# Patient Record
Sex: MALE | Race: WHITE | Employment: OTHER | URBAN - METROPOLITAN AREA
[De-identification: names, ages, dates, MRNs, and addresses within clinical notes are randomized per-mention and may not be internally consistent; named-entity substitution may affect disease eponyms.]

---

## 2017-07-03 RX ORDER — LISINOPRIL 40 MG/1
TABLET ORAL
Qty: 90 TABLET | Refills: 0 | Status: SHIPPED | OUTPATIENT
Start: 2017-07-03 | End: 2017-10-04

## 2017-07-03 RX ORDER — DILTIAZEM HYDROCHLORIDE 300 MG/1
CAPSULE, EXTENDED RELEASE ORAL
Qty: 90 CAPSULE | Refills: 0 | Status: SHIPPED | OUTPATIENT
Start: 2017-07-03 | End: 2017-10-04

## 2017-07-03 NOTE — TELEPHONE ENCOUNTER
Per protocol; failed- route to provider  Patient is due for office visit    [facility] 12/9/16   Routed to  as well

## 2017-07-05 NOTE — TELEPHONE ENCOUNTER
Spoke with patient will call back. Aware last refill until seen in office; patient voiced understanding. Okay to close.

## 2017-10-05 RX ORDER — LISINOPRIL 40 MG/1
TABLET ORAL
Qty: 90 TABLET | Refills: 0 | Status: SHIPPED | OUTPATIENT
Start: 2017-10-05 | End: 2018-01-03

## 2017-10-05 RX ORDER — DILTIAZEM HYDROCHLORIDE 300 MG/1
CAPSULE, EXTENDED RELEASE ORAL
Qty: 90 CAPSULE | Refills: 0 | Status: SHIPPED | OUTPATIENT
Start: 2017-10-05 | End: 2018-01-03

## 2017-10-06 ENCOUNTER — TELEPHONE (OUTPATIENT)
Dept: INTERNAL MEDICINE CLINIC | Facility: CLINIC | Age: 67
End: 2017-10-06

## 2017-10-06 DIAGNOSIS — Z13.1 SCREENING FOR DIABETES MELLITUS: ICD-10-CM

## 2017-10-06 DIAGNOSIS — Z13.220 SCREENING, LIPID: Primary | ICD-10-CM

## 2017-10-06 DIAGNOSIS — Z12.5 SCREENING PSA (PROSTATE SPECIFIC ANTIGEN): ICD-10-CM

## 2017-10-06 DIAGNOSIS — Z13.0 SCREENING FOR DEFICIENCY ANEMIA: ICD-10-CM

## 2017-10-06 NOTE — TELEPHONE ENCOUNTER
Please call patient to reschedule his yearly check-up for after 12/9/17. Orders for labs are in and patient should have the labs done on or after 12/06/17.

## 2017-10-06 NOTE — TELEPHONE ENCOUNTER
Patient having a physical and would like to have labs prior to his px. Please place orders. Patient will be fasting.   Future Appointments  Date Time Provider Louis Jimenez   11/21/2017 9:00 AM Mary Jo Duncan DO [facility] IM

## 2017-10-11 NOTE — TELEPHONE ENCOUNTER
Patient will be out of state the entire month of December. New insurance this year okay to have before 12/9.

## 2017-11-17 ENCOUNTER — LAB ENCOUNTER (OUTPATIENT)
Dept: LAB | Age: 67
End: 2017-11-17
Attending: FAMILY MEDICINE
Payer: MEDICARE

## 2017-11-17 DIAGNOSIS — Z13.220 SCREENING, LIPID: ICD-10-CM

## 2017-11-17 DIAGNOSIS — Z13.1 SCREENING FOR DIABETES MELLITUS: ICD-10-CM

## 2017-11-17 DIAGNOSIS — Z13.0 SCREENING FOR DEFICIENCY ANEMIA: ICD-10-CM

## 2017-11-17 DIAGNOSIS — Z12.5 SCREENING PSA (PROSTATE SPECIFIC ANTIGEN): ICD-10-CM

## 2017-11-17 PROCEDURE — 80061 LIPID PANEL: CPT

## 2017-11-17 PROCEDURE — 85025 COMPLETE CBC W/AUTO DIFF WBC: CPT

## 2017-11-17 PROCEDURE — 80053 COMPREHEN METABOLIC PANEL: CPT

## 2017-11-20 ENCOUNTER — OFFICE VISIT (OUTPATIENT)
Dept: INTERNAL MEDICINE CLINIC | Facility: CLINIC | Age: 67
End: 2017-11-20

## 2017-11-20 ENCOUNTER — LAB ENCOUNTER (OUTPATIENT)
Dept: LAB | Age: 67
End: 2017-11-20
Attending: FAMILY MEDICINE
Payer: MEDICARE

## 2017-11-20 VITALS
RESPIRATION RATE: 16 BRPM | SYSTOLIC BLOOD PRESSURE: 124 MMHG | HEART RATE: 70 BPM | DIASTOLIC BLOOD PRESSURE: 70 MMHG | WEIGHT: 225 LBS | TEMPERATURE: 98 F | BODY MASS INDEX: 31.5 KG/M2 | HEIGHT: 71 IN

## 2017-11-20 DIAGNOSIS — Z11.59 NEED FOR HEPATITIS C SCREENING TEST: ICD-10-CM

## 2017-11-20 DIAGNOSIS — R79.89 ABNORMAL CBC: ICD-10-CM

## 2017-11-20 DIAGNOSIS — I10 ESSENTIAL HYPERTENSION: ICD-10-CM

## 2017-11-20 DIAGNOSIS — Z00.00 ENCOUNTER FOR ANNUAL HEALTH EXAMINATION: Primary | ICD-10-CM

## 2017-11-20 PROCEDURE — 86803 HEPATITIS C AB TEST: CPT

## 2017-11-20 PROCEDURE — 82607 VITAMIN B-12: CPT

## 2017-11-20 PROCEDURE — G0402 INITIAL PREVENTIVE EXAM: HCPCS | Performed by: FAMILY MEDICINE

## 2017-11-20 NOTE — PROGRESS NOTES
HPI:   Kavin Fink is a 74year old male who presents for a Medicare Initial Preventative Physical Exam (Welcome to Medicare- < 12 months on Medicare). Here for annual check-up. Patient has no complaints.  Taking BP meds as prescribed with no drinks alcohol. He reports that he does not use drugs.      REVIEW OF SYSTEMS:   GENERAL: feels well otherwise  SKIN: denies any unusual skin lesions  EYES: denies blurred vision or double vision  HEENT: denies nasal congestion, sinus pain or ST  LUNGS: den auscultation bilaterally, respirations unlabored   Chest Wall:  No tenderness or deformity   Heart:  Regular rate and rhythm, S1, S2 normal, no murmur, rub or gallop   Abdomen:   Soft, non-tender, bowel sounds active all four quadrants,  no masses, no orga plan.    Return in 6 months (on 5/20/2018).      Jony Denis, DO, 11/20/2017       General Health     In the past six months, have you lost more than 10 pounds without trying?: 2 - No    Has your appetite been poor?: No    How does the patient maintain a input here.   Cognitive Assessment     What day of the week is this?: Correct    What month is it?: Correct    What year is it?: Correct    Recall \"Ball\": Correct    Recall \"Flag\": Correct    Recall \"Tree\": Correct       This section provided for quic Annual Monitoring of Persistent     Medications (ACE/ARB, digoxin diuretics, anticonvulsants.)    Potassium  Annually Potassium (mmol/L)   Date Value   11/17/2017 4.2    No flowsheet data found.     Creatinine  Annually Creatinine (mg/dL)   Date Value   1

## 2017-11-20 NOTE — PATIENT INSTRUCTIONS
Edna Cook's SCREENING SCHEDULE   Tests on this list are recommended by your physician but may not be covered, or covered at this frequency, by your insurer. Please check with your insurance carrier before scheduling to verify coverage.     PREV every 10 years- more often if abnormal Colonoscopy,2 Year due on 02/26/2018 Update Health Maintenance if applicable    Flex Sigmoidoscopy Screen  Covered every 5 years No results found for this or any previous visit. No flowsheet data found.      Fecal Occu prescription benefits, but Medicare does not cover unless Medically needed    Zoster (Not covered by Medicare Part B) No orders found for this or any previous visit.  This may be covered with your pharmacy  prescription benefits     Recommended Websites for

## 2018-01-04 RX ORDER — LISINOPRIL 40 MG/1
TABLET ORAL
Qty: 90 TABLET | Refills: 1 | Status: SHIPPED | OUTPATIENT
Start: 2018-01-04 | End: 2018-06-22

## 2018-01-04 RX ORDER — DILTIAZEM HYDROCHLORIDE 300 MG/1
CAPSULE, EXTENDED RELEASE ORAL
Qty: 90 CAPSULE | Refills: 1 | Status: SHIPPED | OUTPATIENT
Start: 2018-01-04 | End: 2018-06-22

## 2018-01-04 RX ORDER — MELOXICAM 15 MG/1
TABLET ORAL
Qty: 30 TABLET | Refills: 2 | Status: SHIPPED | OUTPATIENT
Start: 2018-01-04 | End: 2018-06-22

## 2018-01-04 NOTE — TELEPHONE ENCOUNTER
LOV: 11/20/17   Future office visit: no upcoming visit   Last labs: 11/17/17 Psa Cbc Lipid Cmp   Last RX: Meloxicam: 12/9/16 #30 #2 Refill    Cartia 10/5/17 #90 No Refill    Lisinopril 10/5/17 #90 No Refill  Per protocol: Route to provider

## 2018-06-22 RX ORDER — LISINOPRIL 40 MG/1
40 TABLET ORAL
Qty: 90 TABLET | Refills: 0 | Status: SHIPPED
Start: 2018-06-22 | End: 2018-07-17

## 2018-06-22 RX ORDER — DILTIAZEM HYDROCHLORIDE 300 MG/1
300 CAPSULE, COATED, EXTENDED RELEASE ORAL
Qty: 90 CAPSULE | Refills: 0 | Status: SHIPPED
Start: 2018-06-22 | End: 2018-07-01

## 2018-06-22 RX ORDER — MELOXICAM 15 MG/1
15 TABLET ORAL DAILY
Qty: 30 TABLET | Refills: 2 | Status: SHIPPED
Start: 2018-06-22 | End: 2018-11-20

## 2018-06-22 NOTE — TELEPHONE ENCOUNTER
Spoke to pt and scheduled appt Future Appointments  Date Time Provider Louis Jimenez   6/29/2018 9:45 AM Ricky Alvarez, DO [facility] IM

## 2018-06-22 NOTE — TELEPHONE ENCOUNTER
From: Sammy Claros  Sent: 6/22/2018 10:54 AM CDT  Subject: Medication Renewal Request    Sammy Claros would like a refill of the following medications:     MELOXICAM 15 MG Oral Tab Lydia Remy, DO]     LISINOPRIL 40 MG Oral Tab Chandu Johnston

## 2018-06-29 ENCOUNTER — OFFICE VISIT (OUTPATIENT)
Dept: INTERNAL MEDICINE CLINIC | Facility: CLINIC | Age: 68
End: 2018-06-29

## 2018-06-29 VITALS
WEIGHT: 229.38 LBS | OXYGEN SATURATION: 96 % | SYSTOLIC BLOOD PRESSURE: 120 MMHG | HEIGHT: 71 IN | BODY MASS INDEX: 32.11 KG/M2 | HEART RATE: 71 BPM | DIASTOLIC BLOOD PRESSURE: 60 MMHG | RESPIRATION RATE: 16 BRPM

## 2018-06-29 DIAGNOSIS — I10 ESSENTIAL HYPERTENSION: Primary | ICD-10-CM

## 2018-06-29 PROCEDURE — 99213 OFFICE O/P EST LOW 20 MIN: CPT | Performed by: FAMILY MEDICINE

## 2018-06-29 NOTE — PROGRESS NOTES
HPI:    Patient ID: Kerri Covert is a 79year old male. HPI Here for f/u on BP meds. Has been taking as prescribed with no issues. Takes BP about once every 2 weeks and averages 130/80. No complaints.      Review of Systems   Constitutional: Ne

## 2018-07-02 RX ORDER — DILTIAZEM HYDROCHLORIDE 300 MG/1
CAPSULE, COATED, EXTENDED RELEASE ORAL
Qty: 90 CAPSULE | Refills: 1 | Status: SHIPPED | OUTPATIENT
Start: 2018-07-02 | End: 2018-11-20

## 2018-07-18 RX ORDER — LISINOPRIL 40 MG/1
40 TABLET ORAL
Qty: 90 TABLET | Refills: 1 | Status: SHIPPED | OUTPATIENT
Start: 2018-07-18 | End: 2018-11-20

## 2018-07-18 NOTE — TELEPHONE ENCOUNTER
From: Lizz Galan  Sent: 7/17/2018 6:43 PM CDT  Subject: Medication Renewal Request    Lizz Galan would like a refill of the following medications:     lisinopril 40 MG Oral Tab Jessica St DO]   Patient Comment:  This was not inclu

## 2018-07-18 NOTE — TELEPHONE ENCOUNTER
LOV: 6/29/18 AMS   Future office visit: No upcoming visit  Last labs: 11/20/17 Vit B12 HCV Psa Cc Lipid Cmp   Last RX: Lisinopril 6/22/18 #90 No Refills   Per protocol: Route to provider

## 2018-10-10 ENCOUNTER — TELEPHONE (OUTPATIENT)
Dept: INTERNAL MEDICINE CLINIC | Facility: CLINIC | Age: 68
End: 2018-10-10

## 2018-10-10 DIAGNOSIS — Z13.0 SCREENING FOR BLOOD DISEASE: ICD-10-CM

## 2018-10-10 DIAGNOSIS — Z13.220 SCREENING FOR LIPID DISORDERS: ICD-10-CM

## 2018-10-10 DIAGNOSIS — I10 ESSENTIAL HYPERTENSION: Primary | ICD-10-CM

## 2018-10-10 DIAGNOSIS — Z12.5 ENCOUNTER FOR SCREENING FOR MALIGNANT NEOPLASM OF PROSTATE: ICD-10-CM

## 2018-10-10 NOTE — TELEPHONE ENCOUNTER
Please place orders through [facility] for labs for upcoming wellness visit   Future Appointments   Date Time Provider Louis Jimenez   11/20/2018  9:00 AM Merrill Davis DO [facility] IM     Thank you

## 2018-10-29 ENCOUNTER — OFFICE VISIT (OUTPATIENT)
Dept: INTERNAL MEDICINE CLINIC | Facility: CLINIC | Age: 68
End: 2018-10-29
Payer: MEDICARE

## 2018-10-29 VITALS
SYSTOLIC BLOOD PRESSURE: 136 MMHG | HEART RATE: 72 BPM | RESPIRATION RATE: 16 BRPM | TEMPERATURE: 98 F | BODY MASS INDEX: 32 KG/M2 | WEIGHT: 231 LBS | DIASTOLIC BLOOD PRESSURE: 80 MMHG

## 2018-10-29 DIAGNOSIS — M25.552 PAIN OF LEFT HIP JOINT: Primary | ICD-10-CM

## 2018-10-29 PROCEDURE — 99213 OFFICE O/P EST LOW 20 MIN: CPT | Performed by: FAMILY MEDICINE

## 2018-10-29 RX ORDER — METHYLPREDNISOLONE 4 MG/1
TABLET ORAL
Qty: 1 KIT | Refills: 0 | Status: SHIPPED | OUTPATIENT
Start: 2018-10-29 | End: 2018-11-20 | Stop reason: ALTCHOICE

## 2018-10-29 NOTE — PROGRESS NOTES
HPI:    Patient ID: Fina Larkin is a 79year old male. HPI Here with c/o a few months of pain on side of left hip. Feels some in his buttocks a little as well. No injury. No numbness/tingling/weakness.  Has tried taking Meloxicam for this and do ORTHOPEDIC - INTERNAL       [id number]

## 2018-11-16 ENCOUNTER — LAB ENCOUNTER (OUTPATIENT)
Dept: LAB | Age: 68
End: 2018-11-16
Attending: FAMILY MEDICINE
Payer: MEDICARE

## 2018-11-16 DIAGNOSIS — I10 ESSENTIAL HYPERTENSION: ICD-10-CM

## 2018-11-16 DIAGNOSIS — Z13.0 SCREENING FOR BLOOD DISEASE: ICD-10-CM

## 2018-11-16 DIAGNOSIS — R73.01 IMPAIRED FASTING GLUCOSE: ICD-10-CM

## 2018-11-16 DIAGNOSIS — Z13.220 SCREENING FOR LIPID DISORDERS: ICD-10-CM

## 2018-11-16 DIAGNOSIS — R73.01 IMPAIRED FASTING GLUCOSE: Primary | ICD-10-CM

## 2018-11-16 DIAGNOSIS — Z12.5 ENCOUNTER FOR SCREENING FOR MALIGNANT NEOPLASM OF PROSTATE: ICD-10-CM

## 2018-11-16 PROCEDURE — 80053 COMPREHEN METABOLIC PANEL: CPT

## 2018-11-16 PROCEDURE — 85025 COMPLETE CBC W/AUTO DIFF WBC: CPT

## 2018-11-16 PROCEDURE — 83036 HEMOGLOBIN GLYCOSYLATED A1C: CPT

## 2018-11-16 PROCEDURE — 80061 LIPID PANEL: CPT

## 2018-11-20 ENCOUNTER — OFFICE VISIT (OUTPATIENT)
Dept: INTERNAL MEDICINE CLINIC | Facility: CLINIC | Age: 68
End: 2018-11-20
Payer: MEDICARE

## 2018-11-20 VITALS
DIASTOLIC BLOOD PRESSURE: 72 MMHG | RESPIRATION RATE: 16 BRPM | WEIGHT: 230 LBS | HEART RATE: 72 BPM | BODY MASS INDEX: 32 KG/M2 | TEMPERATURE: 98 F | SYSTOLIC BLOOD PRESSURE: 124 MMHG

## 2018-11-20 DIAGNOSIS — R74.8 ELEVATED LIVER ENZYMES: ICD-10-CM

## 2018-11-20 DIAGNOSIS — I10 ESSENTIAL HYPERTENSION: ICD-10-CM

## 2018-11-20 DIAGNOSIS — R73.02 IMPAIRED GLUCOSE TOLERANCE: ICD-10-CM

## 2018-11-20 DIAGNOSIS — M25.552 LEFT HIP PAIN: ICD-10-CM

## 2018-11-20 DIAGNOSIS — Z00.00 ENCOUNTER FOR ANNUAL HEALTH EXAMINATION: Primary | ICD-10-CM

## 2018-11-20 PROCEDURE — G0438 PPPS, INITIAL VISIT: HCPCS | Performed by: FAMILY MEDICINE

## 2018-11-20 RX ORDER — MELOXICAM 15 MG/1
15 TABLET ORAL DAILY
Qty: 90 TABLET | Refills: 1 | Status: SHIPPED | OUTPATIENT
Start: 2018-11-20 | End: 2019-06-12

## 2018-11-20 RX ORDER — DILTIAZEM HYDROCHLORIDE 300 MG/1
300 CAPSULE, COATED, EXTENDED RELEASE ORAL
Qty: 90 CAPSULE | Refills: 1 | Status: SHIPPED | OUTPATIENT
Start: 2018-11-20 | End: 2019-06-12

## 2018-11-20 RX ORDER — LISINOPRIL 40 MG/1
40 TABLET ORAL
Qty: 90 TABLET | Refills: 1 | Status: SHIPPED | OUTPATIENT
Start: 2018-11-20 | End: 2019-06-12

## 2018-11-23 DIAGNOSIS — R73.02 IMPAIRED GLUCOSE TOLERANCE: ICD-10-CM

## 2018-11-23 DIAGNOSIS — R74.8 ELEVATED LIVER ENZYMES: Primary | ICD-10-CM

## 2018-11-23 NOTE — PROGRESS NOTES
HPI:   Shagufta Smith is a 79year old male who presents for a Medicare Initial Annual Wellness visit (Once after 12 month Medicare anniversary) . Here for annual check-up. Patient continues to have left hip pain and is seeing Ortho.    Taking BP in [place]. The patient has this document but we do not have it in [facility], and patient is instructed to get our office a copy of it for scanning into Epic. He does NOT have a Power of  for [employer] on file in [place].    The patient has this docu Multiple allergies, and Wears glasses. He  has a past surgical history that includes appendectomy; back surgery; and colonoscopy.     His family history includes Depression in an other family member; Heart Disease in his father and mother; Hypertension i age   Head:  Normocephalic, without obvious abnormality, atraumatic   Eyes:  PERRL, conjunctiva/corneas clear, EOM's intact, both eyes   Ears:  Normal TM's and external ear canals, both ears   Nose: Nares normal, septum midline, mucosa normal, no drainage 1 capsule (300 mg total) by mouth once daily. Left hip pain  -     Meloxicam 15 MG Oral Tab; Take 1 tablet (15 mg total) by mouth daily. Impaired glucose tolerance    Elevated liver enzymes      Reviewed lab results with patient.  Impaired glucose vijay Glaucoma Screening      Ophthalmology Visit Annually: Diabetics, FHx Glaucoma, AA>50, > 65 No flowsheet data found.     Prostate Cancer Screening      PSA  Annually PSA due on 11/16/2020  Update Health Maintenance if applicable     Immunizations

## 2019-06-10 ENCOUNTER — APPOINTMENT (OUTPATIENT)
Dept: LAB | Age: 69
End: 2019-06-10
Attending: FAMILY MEDICINE
Payer: MEDICARE

## 2019-06-10 DIAGNOSIS — R73.02 IMPAIRED GLUCOSE TOLERANCE: ICD-10-CM

## 2019-06-10 DIAGNOSIS — R74.8 ELEVATED LIVER ENZYMES: ICD-10-CM

## 2019-06-10 PROCEDURE — 80053 COMPREHEN METABOLIC PANEL: CPT

## 2019-06-10 PROCEDURE — 83036 HEMOGLOBIN GLYCOSYLATED A1C: CPT

## 2019-06-12 ENCOUNTER — PATIENT MESSAGE (OUTPATIENT)
Dept: INTERNAL MEDICINE CLINIC | Facility: CLINIC | Age: 69
End: 2019-06-12

## 2019-06-12 ENCOUNTER — OFFICE VISIT (OUTPATIENT)
Dept: INTERNAL MEDICINE CLINIC | Facility: CLINIC | Age: 69
End: 2019-06-12
Payer: MEDICARE

## 2019-06-12 VITALS
WEIGHT: 228 LBS | DIASTOLIC BLOOD PRESSURE: 72 MMHG | HEART RATE: 76 BPM | SYSTOLIC BLOOD PRESSURE: 122 MMHG | HEIGHT: 67 IN | RESPIRATION RATE: 15 BRPM | BODY MASS INDEX: 35.79 KG/M2 | OXYGEN SATURATION: 96 %

## 2019-06-12 DIAGNOSIS — S43.491A OTHER SPRAIN OF RIGHT SHOULDER JOINT, INITIAL ENCOUNTER: ICD-10-CM

## 2019-06-12 DIAGNOSIS — I10 ESSENTIAL HYPERTENSION: Primary | ICD-10-CM

## 2019-06-12 DIAGNOSIS — R73.02 IMPAIRED GLUCOSE TOLERANCE: ICD-10-CM

## 2019-06-12 PROCEDURE — 99214 OFFICE O/P EST MOD 30 MIN: CPT | Performed by: FAMILY MEDICINE

## 2019-06-12 PROCEDURE — 1111F DSCHRG MED/CURRENT MED MERGE: CPT | Performed by: FAMILY MEDICINE

## 2019-06-12 RX ORDER — DILTIAZEM HYDROCHLORIDE 300 MG/1
300 CAPSULE, COATED, EXTENDED RELEASE ORAL
Qty: 90 CAPSULE | Refills: 1 | Status: SHIPPED | OUTPATIENT
Start: 2019-06-12 | End: 2019-11-22

## 2019-06-12 RX ORDER — MELOXICAM 15 MG/1
15 TABLET ORAL DAILY
Qty: 90 TABLET | Refills: 1 | Status: SHIPPED | OUTPATIENT
Start: 2019-06-12 | End: 2019-11-22

## 2019-06-12 RX ORDER — LISINOPRIL 40 MG/1
40 TABLET ORAL
Qty: 90 TABLET | Refills: 1 | Status: SHIPPED | OUTPATIENT
Start: 2019-06-12 | End: 2019-11-22

## 2019-06-12 NOTE — TELEPHONE ENCOUNTER
From: Jose Bourne  To: Mary Jo Duncan DO  Sent: 6/12/2019 9:48 AM CDT  Subject: Test Results Question    When can I expect my recent lab work results to be posted?     Thanks,  Meg Mooney

## 2019-06-12 NOTE — PROGRESS NOTES
HPI:    Patient ID: Joan Bailon is a 81year old male. HPI Here for f/u on BP. Taking med as prescribed with no issues. Doesn't check BP regularly but no chest pain/shortness of breath/lightheadedness/headaches.    Has been trying to limit carbs ASSESSMENT/PLAN:   Essential hypertension  (primary encounter diagnosis)  Impaired glucose tolerance  Other sprain of right shoulder joint, initial encounter  1. BP controlled on meds. Continue. 2. Reviewed results. Improved from previous.  Continue

## 2019-09-17 PROBLEM — M75.81 TENDINITIS OF RIGHT ROTATOR CUFF: Status: ACTIVE | Noted: 2019-09-17

## 2019-10-01 ENCOUNTER — TELEPHONE (OUTPATIENT)
Dept: INTERNAL MEDICINE CLINIC | Facility: CLINIC | Age: 69
End: 2019-10-01

## 2019-10-02 ENCOUNTER — TELEPHONE (OUTPATIENT)
Dept: INTERNAL MEDICINE CLINIC | Facility: CLINIC | Age: 69
End: 2019-10-02

## 2019-10-02 DIAGNOSIS — E78.00 ELEVATED CHOLESTEROL: ICD-10-CM

## 2019-10-02 DIAGNOSIS — R73.09 ELEVATED GLUCOSE: ICD-10-CM

## 2019-10-02 DIAGNOSIS — R73.02 IMPAIRED GLUCOSE TOLERANCE: ICD-10-CM

## 2019-10-02 DIAGNOSIS — I10 ESSENTIAL HYPERTENSION: Primary | ICD-10-CM

## 2019-10-02 DIAGNOSIS — Z12.5 SCREENING FOR PROSTATE CANCER: ICD-10-CM

## 2019-10-02 NOTE — TELEPHONE ENCOUNTER
Future Appointments   Date Time Provider Louis Barbara                        11/22/2019 10:00 AM Modesto Nuñez, DO [facility]       Patient is scheduled for Annual Physical.  Please place orders with Emory Peralta Dr. Patient will be fasting.

## 2019-11-14 ENCOUNTER — APPOINTMENT (OUTPATIENT)
Dept: LAB | Age: 69
End: 2019-11-14
Attending: FAMILY MEDICINE
Payer: MEDICARE

## 2019-11-14 DIAGNOSIS — I10 ESSENTIAL HYPERTENSION: ICD-10-CM

## 2019-11-14 DIAGNOSIS — R73.02 IMPAIRED GLUCOSE TOLERANCE: ICD-10-CM

## 2019-11-14 DIAGNOSIS — Z12.5 SCREENING FOR PROSTATE CANCER: ICD-10-CM

## 2019-11-14 DIAGNOSIS — E78.00 ELEVATED CHOLESTEROL: ICD-10-CM

## 2019-11-14 DIAGNOSIS — R73.09 ELEVATED GLUCOSE: ICD-10-CM

## 2019-11-14 PROCEDURE — 80053 COMPREHEN METABOLIC PANEL: CPT

## 2019-11-14 PROCEDURE — 83036 HEMOGLOBIN GLYCOSYLATED A1C: CPT

## 2019-11-14 PROCEDURE — 80061 LIPID PANEL: CPT

## 2019-11-22 ENCOUNTER — OFFICE VISIT (OUTPATIENT)
Dept: INTERNAL MEDICINE CLINIC | Facility: CLINIC | Age: 69
End: 2019-11-22
Payer: MEDICARE

## 2019-11-22 VITALS
HEART RATE: 76 BPM | DIASTOLIC BLOOD PRESSURE: 64 MMHG | HEIGHT: 71.26 IN | BODY MASS INDEX: 31.57 KG/M2 | RESPIRATION RATE: 18 BRPM | SYSTOLIC BLOOD PRESSURE: 114 MMHG | TEMPERATURE: 97 F | WEIGHT: 228 LBS

## 2019-11-22 DIAGNOSIS — I10 ESSENTIAL HYPERTENSION: ICD-10-CM

## 2019-11-22 DIAGNOSIS — Z00.00 ENCOUNTER FOR ANNUAL HEALTH EXAMINATION: Primary | ICD-10-CM

## 2019-11-22 DIAGNOSIS — M75.81 TENDINITIS OF RIGHT ROTATOR CUFF: ICD-10-CM

## 2019-11-22 PROCEDURE — G0439 PPPS, SUBSEQ VISIT: HCPCS | Performed by: FAMILY MEDICINE

## 2019-11-22 RX ORDER — DILTIAZEM HYDROCHLORIDE 300 MG/1
300 CAPSULE, COATED, EXTENDED RELEASE ORAL
Qty: 90 CAPSULE | Refills: 1 | Status: SHIPPED | OUTPATIENT
Start: 2019-11-22 | End: 2020-09-16

## 2019-11-22 RX ORDER — LISINOPRIL 40 MG/1
40 TABLET ORAL
Qty: 90 TABLET | Refills: 1 | Status: SHIPPED | OUTPATIENT
Start: 2019-11-22 | End: 2020-09-16

## 2019-11-22 NOTE — PROGRESS NOTES
HPI:   Israel Fitzpatrick is a 81year old male who presents for a Medicare Subsequent Annual Wellness visit (Pt already had Initial Annual Wellness). Here for f/u on BP. Taking med as prescribed with no issues.  Doesn't check BP regularly but no cindi 11/14/2019    HDL 80 (H) 11/14/2019    LDL 84 11/14/2019    TRIG 140 11/14/2019          Last Chemistry Labs:   Lab Results   Component Value Date    AST 25 11/14/2019    ALT 58 11/14/2019    CA 9.1 11/14/2019    ALB 3.4 11/14/2019    CREATSERUM 0.99 11/14 denies abdominal pain, denies heartburn  : 1 per night nocturia, no complaint of urinary incontinence  MUSCULOSKELETAL: denies back pain  NEURO: denies headaches  PSYCHE: denies depression or anxiety  HEMATOLOGIC: denies hx of anemia  ENDOCRINE: denies t Neurologic: Normal            Vaccination History     Immunization History   Administered Date(s) Administered   • Depo-Medrol 40mg Inj 10/30/2018, 02/22/2019, 10/17/2019   • FLU VACC High Dose 72 YRS & Older PRSV Free (23000) 10/09/2017, 09/25/2018   • Procedure External Lab or Procedure   Diabetes Screening      HbgA1C   Annually HgbA1C (%)   Date Value   11/14/2019 5.7 (H)       No flowsheet data found.     Fasting Blood Sugar (FSB)Annually Glucose (mg/dL)   Date Value   11/14/2019 112 (H)       Cardiov Zoster   Not covered by Medicare Part B No vaccine history found This may be covered with your pharmacy  prescription benefits      [facility] Internal Lab or Procedure External Lab or Procedure      Annual Monitoring of Persistent     Med

## 2019-11-22 NOTE — PATIENT INSTRUCTIONS
Yoly Cook's SCREENING SCHEDULE   Tests on this list are recommended by your physician but may not be covered, or covered at this frequency, by your insurer. Please check with your insurance carrier before scheduling to verify coverage.     PREV years- more often if abnormal Colonoscopy due on 04/27/2023 Update Health Maintenance if applicable    Flex Sigmoidoscopy Screen  Covered every 5 years No results found for this or any previous visit. No flowsheet data found.      Fecal Occult Blood   Cover benefits, but Medicare does not cover unless Medically needed    Zoster (Not covered by Medicare Part B) No orders found for this or any previous visit.  This may be covered with your pharmacy  prescription benefits     Recommended Websites for Advanced Dir

## 2019-12-30 ENCOUNTER — HOSPITAL ENCOUNTER (OUTPATIENT)
Dept: MRI IMAGING | Age: 69
Discharge: HOME | End: 2019-12-30
Attending: ORTHOPAEDIC SURGERY
Payer: MEDICARE

## 2019-12-30 DIAGNOSIS — M75.81 TENDINITIS OF RIGHT ROTATOR CUFF: ICD-10-CM

## 2019-12-30 PROCEDURE — 73221 MRI JOINT UPR EXTREM W/O DYE: CPT | Performed by: ORTHOPAEDIC SURGERY

## 2020-02-27 PROBLEM — Z98.890 S/P ARTHROSCOPY OF RIGHT SHOULDER: Status: ACTIVE | Noted: 2020-02-27

## 2020-07-14 ENCOUNTER — PATIENT MESSAGE (OUTPATIENT)
Dept: INTERNAL MEDICINE CLINIC | Facility: CLINIC | Age: 70
End: 2020-07-14

## 2020-07-14 RX ORDER — METHYLPREDNISOLONE 4 MG/1
TABLET ORAL
Qty: 1 KIT | Refills: 0 | Status: SHIPPED | OUTPATIENT
Start: 2020-07-14 | End: 2020-12-04

## 2020-07-14 NOTE — TELEPHONE ENCOUNTER
Medrol dose pack sent to his listed pharmacy. Give ER warning signs- worsening pain, fever, spreading redness. Have him schedule with Pacheco Marrufo this Thursday to see how he is doing (he is overdue for BP office visit anyway).

## 2020-07-14 NOTE — TELEPHONE ENCOUNTER
From: Caridad Fried  To: Ishaan Stroud DO  Sent: 7/14/2020 1:22 PM CDT  Subject: Other    I developed a severe case of Gout (Ankle and Finger) and noticed Dr. Stephanie Stover is not available to next week. Can you please advise how to proceed?  I added p

## 2020-07-14 NOTE — TELEPHONE ENCOUNTER
Patient notified to start Medrol Dose pack sent to his pharmacy on file, ER warnings given ie worsening pain, fever or spreading redness. Pt scheduled with AL for Thurs 7/16/20 at 9:30am.  Pt verbalizes understanding.

## 2020-07-16 ENCOUNTER — OFFICE VISIT (OUTPATIENT)
Dept: INTERNAL MEDICINE CLINIC | Facility: CLINIC | Age: 70
End: 2020-07-16
Payer: MEDICARE

## 2020-07-16 VITALS
DIASTOLIC BLOOD PRESSURE: 76 MMHG | BODY MASS INDEX: 32.2 KG/M2 | HEART RATE: 70 BPM | SYSTOLIC BLOOD PRESSURE: 148 MMHG | WEIGHT: 230 LBS | TEMPERATURE: 98 F | HEIGHT: 71 IN

## 2020-07-16 DIAGNOSIS — I10 ESSENTIAL HYPERTENSION: Primary | ICD-10-CM

## 2020-07-16 DIAGNOSIS — R73.09 ELEVATED HEMOGLOBIN A1C MEASUREMENT: ICD-10-CM

## 2020-07-16 DIAGNOSIS — M10.9 ACUTE GOUT OF RIGHT ANKLE, UNSPECIFIED CAUSE: ICD-10-CM

## 2020-07-16 PROCEDURE — 99214 OFFICE O/P EST MOD 30 MIN: CPT | Performed by: NURSE PRACTITIONER

## 2020-07-16 NOTE — PATIENT INSTRUCTIONS
Continue to take steroids as directed. Monitor your diet and limit your intake of alcohol. Schedule an appointment to get fasting labs completed. Follow-up in 2-weeks. Call sooner if your symptoms worsen after completing the steroid pack.       Please c reduce uric acid levels include febuxostat, lesinurad, and probencid.  Other medicines can help relieve pain and swelling during an acute attack. Medicines such as NSAIDs (nonsteroidal anti-inflammatory medicines), steroids, and colchicine may be prescribed

## 2020-07-16 NOTE — PROGRESS NOTES
HPI:    Patient ID: Ally Fink is a 78year old male. Patient presents with c/o right ankle pain and swelling and swelling overlying the PIP joint of the 3rd digit on the left hand for 6-days. Reports symptoms worsened 2-days-ago.   [facility] Med Neurological: Negative for dizziness, weakness, light-headedness and headaches. Current Outpatient Medications   Medication Sig Dispense Refill   • methylPREDNISolone (MEDROL) 4 MG Oral Tablet Therapy Pack As directed.  1 kit 0   • lisinopril 40 Meds This Visit:  Requested Prescriptions      No prescriptions requested or ordered in this encounter       Imaging & Referrals:  None       #[id number]

## 2020-07-17 ENCOUNTER — LAB ENCOUNTER (OUTPATIENT)
Dept: LAB | Age: 70
End: 2020-07-17
Attending: NURSE PRACTITIONER
Payer: MEDICARE

## 2020-07-17 DIAGNOSIS — I10 ESSENTIAL HYPERTENSION: ICD-10-CM

## 2020-07-17 DIAGNOSIS — R73.09 ELEVATED HEMOGLOBIN A1C MEASUREMENT: ICD-10-CM

## 2020-07-17 DIAGNOSIS — M10.9 ACUTE GOUT OF RIGHT ANKLE, UNSPECIFIED CAUSE: ICD-10-CM

## 2020-07-17 LAB
ALBUMIN SERPL-MCNC: 3.7 G/DL (ref 3.4–5)
ALBUMIN/GLOB SERPL: 0.8 {RATIO} (ref 1–2)
ALP LIVER SERPL-CCNC: 59 U/L (ref 45–117)
ALT SERPL-CCNC: 36 U/L (ref 16–61)
ANION GAP SERPL CALC-SCNC: 3 MMOL/L (ref 0–18)
AST SERPL-CCNC: 20 U/L (ref 15–37)
BASOPHILS # BLD AUTO: 0.02 X10(3) UL (ref 0–0.2)
BASOPHILS NFR BLD AUTO: 0.2 %
BILIRUB SERPL-MCNC: 0.5 MG/DL (ref 0.1–2)
BUN BLD-MCNC: 17 MG/DL (ref 7–18)
BUN/CREAT SERPL: 16 (ref 10–20)
CALCIUM BLD-MCNC: 9.5 MG/DL (ref 8.5–10.1)
CHLORIDE SERPL-SCNC: 105 MMOL/L (ref 98–112)
CO2 SERPL-SCNC: 27 MMOL/L (ref 21–32)
CREAT BLD-MCNC: 1.06 MG/DL (ref 0.7–1.3)
DEPRECATED RDW RBC AUTO: 42.7 FL (ref 35.1–46.3)
EOSINOPHIL # BLD AUTO: 0.01 X10(3) UL (ref 0–0.7)
EOSINOPHIL NFR BLD AUTO: 0.1 %
ERYTHROCYTE [DISTWIDTH] IN BLOOD BY AUTOMATED COUNT: 11.5 % (ref 11–15)
EST. AVERAGE GLUCOSE BLD GHB EST-MCNC: 117 MG/DL (ref 68–126)
GLOBULIN PLAS-MCNC: 4.5 G/DL (ref 2.8–4.4)
GLUCOSE BLD-MCNC: 118 MG/DL (ref 70–99)
HBA1C MFR BLD HPLC: 5.7 % (ref ?–5.7)
HCT VFR BLD AUTO: 44.9 % (ref 39–53)
HGB BLD-MCNC: 15.1 G/DL (ref 13–17.5)
IMM GRANULOCYTES # BLD AUTO: 0.05 X10(3) UL (ref 0–1)
IMM GRANULOCYTES NFR BLD: 0.5 %
LYMPHOCYTES # BLD AUTO: 2.03 X10(3) UL (ref 1–4)
LYMPHOCYTES NFR BLD AUTO: 19.4 %
M PROTEIN MFR SERPL ELPH: 8.2 G/DL (ref 6.4–8.2)
MCH RBC QN AUTO: 33.9 PG (ref 26–34)
MCHC RBC AUTO-ENTMCNC: 33.6 G/DL (ref 31–37)
MCV RBC AUTO: 100.9 FL (ref 80–100)
MONOCYTES # BLD AUTO: 0.97 X10(3) UL (ref 0.1–1)
MONOCYTES NFR BLD AUTO: 9.3 %
NEUTROPHILS # BLD AUTO: 7.4 X10 (3) UL (ref 1.5–7.7)
NEUTROPHILS # BLD AUTO: 7.4 X10(3) UL (ref 1.5–7.7)
NEUTROPHILS NFR BLD AUTO: 70.5 %
OSMOLALITY SERPL CALC.SUM OF ELEC: 283 MOSM/KG (ref 275–295)
PATIENT FASTING Y/N/NP: YES
PLATELET # BLD AUTO: 241 10(3)UL (ref 150–450)
POTASSIUM SERPL-SCNC: 3.7 MMOL/L (ref 3.5–5.1)
RBC # BLD AUTO: 4.45 X10(6)UL (ref 3.8–5.8)
SODIUM SERPL-SCNC: 135 MMOL/L (ref 136–145)
WBC # BLD AUTO: 10.5 X10(3) UL (ref 4–11)

## 2020-07-17 PROCEDURE — 80053 COMPREHEN METABOLIC PANEL: CPT

## 2020-07-17 PROCEDURE — 36415 COLL VENOUS BLD VENIPUNCTURE: CPT

## 2020-07-17 PROCEDURE — 85025 COMPLETE CBC W/AUTO DIFF WBC: CPT

## 2020-07-17 PROCEDURE — 83036 HEMOGLOBIN GLYCOSYLATED A1C: CPT

## 2020-07-29 ENCOUNTER — TELEPHONE (OUTPATIENT)
Dept: INTERNAL MEDICINE CLINIC | Facility: CLINIC | Age: 70
End: 2020-07-29

## 2020-07-29 NOTE — TELEPHONE ENCOUNTER
Per mychart message patient no longer needs follow up visit due to patient reporting improvement of symptoms and sent BP for AMS to review. PER AMS on 7/27/2020 \"no need to follow-up. Continue current medication\".       Patient canceled follow up appoint

## 2020-07-29 NOTE — TELEPHONE ENCOUNTER
Appointment canceled for Mary Cierra ([id number])   Visit Type: [facility]   Date        Time      Length    Provider                  Department   7/31/2020    8:45 AM  15 mins.  Ellie Iglesias DO       [facility] [address] [place] [address]      Reason for

## 2020-09-16 DIAGNOSIS — I10 ESSENTIAL HYPERTENSION: ICD-10-CM

## 2020-09-17 RX ORDER — LISINOPRIL 40 MG/1
40 TABLET ORAL
Qty: 90 TABLET | Refills: 1 | Status: SHIPPED | OUTPATIENT
Start: 2020-09-17 | End: 2020-12-04

## 2020-09-17 RX ORDER — DILTIAZEM HYDROCHLORIDE 300 MG/1
300 CAPSULE, COATED, EXTENDED RELEASE ORAL
Qty: 90 CAPSULE | Refills: 1 | Status: SHIPPED | OUTPATIENT
Start: 2020-09-17 | End: 2020-12-04

## 2020-11-10 ENCOUNTER — TELEPHONE (OUTPATIENT)
Dept: INTERNAL MEDICINE CLINIC | Facility: CLINIC | Age: 70
End: 2020-11-10

## 2020-11-10 DIAGNOSIS — R73.02 IMPAIRED GLUCOSE TOLERANCE: ICD-10-CM

## 2020-11-10 DIAGNOSIS — R73.09 ELEVATED GLUCOSE: ICD-10-CM

## 2020-11-10 DIAGNOSIS — R73.09 ELEVATED HEMOGLOBIN A1C MEASUREMENT: ICD-10-CM

## 2020-11-10 DIAGNOSIS — Z00.00 ENCOUNTER FOR ANNUAL HEALTH EXAMINATION: Primary | ICD-10-CM

## 2020-11-10 DIAGNOSIS — E78.00 ELEVATED CHOLESTEROL: ICD-10-CM

## 2020-11-10 NOTE — TELEPHONE ENCOUNTER
Future Appointments   Date Time Provider Louis Barbara   11/25/2020  2:00 PM Joy Hernandez,  [facility]     For       Pt would like fasting labs sent to [facility]. Pt aware to complete 5-7 days ahead.

## 2020-11-16 ENCOUNTER — PATIENT MESSAGE (OUTPATIENT)
Dept: INTERNAL MEDICINE CLINIC | Facility: CLINIC | Age: 70
End: 2020-11-16

## 2020-11-16 DIAGNOSIS — Z12.5 SCREENING PSA (PROSTATE SPECIFIC ANTIGEN): Primary | ICD-10-CM

## 2020-11-16 NOTE — TELEPHONE ENCOUNTER
From: Jerry Zurita  To: Genia Iraheta DO  Sent: 11/16/2020 12:18 PM CST  Subject: Non-Urgent Medical Question    Just want to make sure a PSA test is included in my lab work.     Thanks,  Zora Riggs

## 2020-11-19 ENCOUNTER — LAB ENCOUNTER (OUTPATIENT)
Dept: LAB | Age: 70
End: 2020-11-19
Attending: FAMILY MEDICINE
Payer: MEDICARE

## 2020-11-19 DIAGNOSIS — Z12.5 SCREENING PSA (PROSTATE SPECIFIC ANTIGEN): ICD-10-CM

## 2020-11-19 DIAGNOSIS — Z00.00 ENCOUNTER FOR ANNUAL HEALTH EXAMINATION: ICD-10-CM

## 2020-11-19 DIAGNOSIS — R73.02 IMPAIRED GLUCOSE TOLERANCE: ICD-10-CM

## 2020-11-19 PROCEDURE — 83036 HEMOGLOBIN GLYCOSYLATED A1C: CPT

## 2020-11-19 PROCEDURE — 36415 COLL VENOUS BLD VENIPUNCTURE: CPT

## 2020-11-19 PROCEDURE — 80061 LIPID PANEL: CPT

## 2020-12-04 ENCOUNTER — OFFICE VISIT (OUTPATIENT)
Dept: INTERNAL MEDICINE CLINIC | Facility: CLINIC | Age: 70
End: 2020-12-04
Payer: MEDICARE

## 2020-12-04 VITALS
OXYGEN SATURATION: 97 % | TEMPERATURE: 99 F | SYSTOLIC BLOOD PRESSURE: 138 MMHG | HEIGHT: 71 IN | WEIGHT: 229.63 LBS | BODY MASS INDEX: 32.15 KG/M2 | HEART RATE: 71 BPM | DIASTOLIC BLOOD PRESSURE: 74 MMHG

## 2020-12-04 DIAGNOSIS — I10 ESSENTIAL HYPERTENSION: ICD-10-CM

## 2020-12-04 DIAGNOSIS — Z00.00 ENCOUNTER FOR ANNUAL HEALTH EXAMINATION: Primary | ICD-10-CM

## 2020-12-04 DIAGNOSIS — Z12.5 SCREENING PSA (PROSTATE SPECIFIC ANTIGEN): ICD-10-CM

## 2020-12-04 DIAGNOSIS — R97.20 ELEVATED PSA: ICD-10-CM

## 2020-12-04 PROBLEM — M75.81 TENDINITIS OF RIGHT ROTATOR CUFF: Status: RESOLVED | Noted: 2019-09-17 | Resolved: 2020-12-04

## 2020-12-04 PROBLEM — Z98.890 S/P ARTHROSCOPY OF RIGHT SHOULDER: Status: RESOLVED | Noted: 2020-02-27 | Resolved: 2020-12-04

## 2020-12-04 PROCEDURE — G0439 PPPS, SUBSEQ VISIT: HCPCS | Performed by: FAMILY MEDICINE

## 2020-12-04 RX ORDER — LISINOPRIL 40 MG/1
40 TABLET ORAL
Qty: 90 TABLET | Refills: 1 | Status: SHIPPED | OUTPATIENT
Start: 2020-12-04 | End: 2021-11-01

## 2020-12-04 RX ORDER — DILTIAZEM HYDROCHLORIDE 300 MG/1
300 CAPSULE, COATED, EXTENDED RELEASE ORAL
Qty: 90 CAPSULE | Refills: 1 | Status: SHIPPED | OUTPATIENT
Start: 2020-12-04 | End: 2021-11-01

## 2020-12-04 NOTE — PATIENT INSTRUCTIONS
Repeat your PSA blood test in 2 weeks.  Avoid strenuous activity, lifting, bike riding, intercourse for at least 48 hours prior to test.         Wanda Cook's SCREENING SCHEDULE   Tests on this list are recommended by your physician but may not be smoked more than 100 cigarettes in their lifetime   • Anyone with a family history    Colorectal Cancer Screening Covered up to Age 76     Colonoscopy Screen   Covered every 10 years- more often if abnormal Colonoscopy due on 04/27/2023 Update [facility] Maint Only covered with a cut with metal- TD and TDaP Not covered by Medicare Part B) No orders found for this or any previous visit.  This may be covered with your prescription benefits, but Medicare does not cover unless Medically needed    Zoster (Not covered

## 2020-12-04 NOTE — PROGRESS NOTES
HPI:   Sammy Claros is a 78year old male who presents for a Medicare Subsequent Annual Wellness visit (Pt already had Initial Annual Wellness).     Essential hypertension- taking meds, no symptoms of elevated BP.  -     lisinopril 40 MG Oral Tab; Last Cholesterol Labs:   Lab Results   Component Value Date    CHOLEST 189 11/19/2020    HDL 66 (H) 11/19/2020    LDL 99 11/19/2020    TRIG 118 11/19/2020          Last Chemistry Labs:   Lab Results   Component Value Date    AST 20 07/17/2020    ALT 36 0 drugs.     REVIEW OF SYSTEMS:   GENERAL: feels well otherwise  SKIN: denies any unusual skin lesions  EYES: denies blurred vision or double vision  HEENT: denies nasal congestion, sinus pain or ST  LUNGS: denies shortness of breath with exertion  CARDIOVAS Extremities: Extremities normal, atraumatic, no cyanosis or edema   Pulses: 2+ and symmetric   Skin: Skin color, texture, turgor normal, no rashes or lesions   Lymph nodes: Cervical, supraclavicular, and axillary nodes normal   Neurologic: Normal daily.  -     dilTIAZem HCl ER Coated Beads 300 MG Oral Capsule SR 24 Hr; Take 1 capsule (300 mg total) by mouth once daily. Elevated PSA, Screening PSA (prostate specific antigen)- recheck PSA in 2 weeks. Urology if still elevated.    -     PSA SCREEN; applicable     Immunizations (Update Immunization Activity if applicable)     Influenza  Covered Annually 9/16/2020   Please get every year    Pneumococcal 13 (Prevnar)  Covered Once after 65 10/09/2017 Please get once after your 65th birthday    Reanna Barrow

## 2020-12-22 ENCOUNTER — LAB ENCOUNTER (OUTPATIENT)
Dept: LAB | Age: 70
End: 2020-12-22
Attending: FAMILY MEDICINE
Payer: MEDICARE

## 2020-12-22 DIAGNOSIS — Z12.5 SCREENING PSA (PROSTATE SPECIFIC ANTIGEN): ICD-10-CM

## 2020-12-22 PROCEDURE — 36415 COLL VENOUS BLD VENIPUNCTURE: CPT

## 2021-03-12 DIAGNOSIS — Z23 NEED FOR VACCINATION: ICD-10-CM

## 2021-09-15 ENCOUNTER — TELEPHONE (OUTPATIENT)
Dept: INTERNAL MEDICINE CLINIC | Facility: CLINIC | Age: 71
End: 2021-09-15

## 2021-11-01 ENCOUNTER — TELEPHONE (OUTPATIENT)
Dept: INTERNAL MEDICINE CLINIC | Facility: CLINIC | Age: 71
End: 2021-11-01

## 2021-11-01 DIAGNOSIS — E78.00 ELEVATED CHOLESTEROL: ICD-10-CM

## 2021-11-01 DIAGNOSIS — R73.09 ELEVATED GLUCOSE: ICD-10-CM

## 2021-11-01 DIAGNOSIS — Z12.5 SCREENING PSA (PROSTATE SPECIFIC ANTIGEN): Primary | ICD-10-CM

## 2021-11-01 DIAGNOSIS — Z00.00 ENCOUNTER FOR ANNUAL HEALTH EXAMINATION: ICD-10-CM

## 2021-11-01 DIAGNOSIS — I10 ESSENTIAL HYPERTENSION: ICD-10-CM

## 2021-11-01 RX ORDER — LISINOPRIL 40 MG/1
40 TABLET ORAL
Qty: 90 TABLET | Refills: 1 | Status: SHIPPED | OUTPATIENT
Start: 2021-11-01

## 2021-11-01 RX ORDER — DILTIAZEM HYDROCHLORIDE 300 MG/1
300 CAPSULE, COATED, EXTENDED RELEASE ORAL
Qty: 90 CAPSULE | Refills: 1 | Status: SHIPPED | OUTPATIENT
Start: 2021-11-01

## 2021-11-01 NOTE — TELEPHONE ENCOUNTER
Last visit- 12/04/2020 cpe     Last refill- 12/04/2020 diltiazem hcl er 300mg QTY90 1R,  12/04/2020 lisinopril 40mg QTY90 1R    Last labs- 11/19/2020 psa screen, hemoglobin a1c, lipid  Future Appointments   Date Time Provider Louis Jimenez   12/9/2021

## 2021-11-01 NOTE — TELEPHONE ENCOUNTER
Future Appointments   Date Time Provider Louis Barbara   12/13/2021  9:00 AM Niya Gibson, DO [facility]     Orders to edward- Pt informed that labs need to be completed no sooner than 2 weeks prior to the appt.  Pt aware to fast-no call ba

## 2021-12-09 ENCOUNTER — LAB ENCOUNTER (OUTPATIENT)
Dept: LAB | Age: 71
End: 2021-12-09
Attending: FAMILY MEDICINE
Payer: MEDICARE

## 2021-12-09 DIAGNOSIS — R79.89 ABNORMAL CBC: Primary | ICD-10-CM

## 2021-12-09 DIAGNOSIS — R79.89 ABNORMAL CBC: ICD-10-CM

## 2021-12-09 DIAGNOSIS — Z12.5 SCREENING PSA (PROSTATE SPECIFIC ANTIGEN): ICD-10-CM

## 2021-12-09 DIAGNOSIS — R73.09 ELEVATED GLUCOSE: ICD-10-CM

## 2021-12-09 DIAGNOSIS — Z00.00 ENCOUNTER FOR ANNUAL HEALTH EXAMINATION: ICD-10-CM

## 2021-12-09 DIAGNOSIS — R73.01 IMPAIRED FASTING GLUCOSE: ICD-10-CM

## 2021-12-09 DIAGNOSIS — E78.00 ELEVATED CHOLESTEROL: ICD-10-CM

## 2021-12-09 PROCEDURE — 82607 VITAMIN B-12: CPT

## 2021-12-09 PROCEDURE — 82746 ASSAY OF FOLIC ACID SERUM: CPT

## 2021-12-09 PROCEDURE — 83036 HEMOGLOBIN GLYCOSYLATED A1C: CPT

## 2021-12-09 PROCEDURE — 80053 COMPREHEN METABOLIC PANEL: CPT

## 2021-12-09 PROCEDURE — 85025 COMPLETE CBC W/AUTO DIFF WBC: CPT

## 2021-12-09 PROCEDURE — 80061 LIPID PANEL: CPT

## 2021-12-09 PROCEDURE — 36415 COLL VENOUS BLD VENIPUNCTURE: CPT

## 2021-12-13 ENCOUNTER — OFFICE VISIT (OUTPATIENT)
Dept: INTERNAL MEDICINE CLINIC | Facility: CLINIC | Age: 71
End: 2021-12-13
Payer: MEDICARE

## 2021-12-13 VITALS
HEART RATE: 71 BPM | OXYGEN SATURATION: 97 % | WEIGHT: 234.81 LBS | DIASTOLIC BLOOD PRESSURE: 68 MMHG | BODY MASS INDEX: 32.87 KG/M2 | SYSTOLIC BLOOD PRESSURE: 136 MMHG | HEIGHT: 71 IN

## 2021-12-13 DIAGNOSIS — Z00.00 ENCOUNTER FOR ANNUAL HEALTH EXAMINATION: Primary | ICD-10-CM

## 2021-12-13 DIAGNOSIS — R73.02 IMPAIRED GLUCOSE TOLERANCE: ICD-10-CM

## 2021-12-13 DIAGNOSIS — I10 ESSENTIAL HYPERTENSION: ICD-10-CM

## 2021-12-13 DIAGNOSIS — S91.101A OPEN WOUND OF RIGHT GREAT TOE, INITIAL ENCOUNTER: ICD-10-CM

## 2021-12-13 PROCEDURE — 90714 TD VACC NO PRESV 7 YRS+ IM: CPT | Performed by: FAMILY MEDICINE

## 2021-12-13 PROCEDURE — G0439 PPPS, SUBSEQ VISIT: HCPCS | Performed by: FAMILY MEDICINE

## 2021-12-13 PROCEDURE — 90471 IMMUNIZATION ADMIN: CPT | Performed by: FAMILY MEDICINE

## 2021-12-13 NOTE — PROGRESS NOTES
HPI:   Shagufta Smith is a 73year old male who presents for a Medicare Subsequent Annual Wellness visit (Pt already had Initial Annual Wellness).     Encounter for annual health examination- recently lost his sister and hasn't been eating as health Encounters:  12/13/21 : 234 lb 12.8 oz (106.5 kg)  12/04/20 : 229 lb 9.6 oz (104.1 kg)  07/16/20 : 230 lb (104.3 kg)     Last Cholesterol Labs:   Lab Results   Component Value Date    CHOLEST 223 (H) 12/09/2021    HDL 74 (H) 12/09/2021     12/09/202 reports that he does not use drugs.      REVIEW OF SYSTEMS:   GENERAL: feels well otherwise  SKIN: denies any unusual skin lesions  EYES: denies blurred vision or double vision  HEENT: denies nasal congestion, sinus pain or ST  LUNGS: denies shortness of br murmur, rub or gallop               Extremities: Extremities normal, atraumatic, no cyanosis or edema   Pulses: 2+ and symmetric   Skin: Skin color, texture, turgor normal, no rashes, base of right great toe with healing wound, approx 1.5cm long, no surrou PRESERVE FREE  -keep clean and dry and watch for signs of infection. Essential hypertension- BP controlled on med. Continue. Impaired glucose tolerance- limit carbs and sugars. Return in 6 months (on 6/13/2022).      Ishaan Stroud DO, 12/13/202 Men who are 73-68 years old and have ever smoked   • Anyone with a family history -     Colorectal Cancer Screening  Covered for ages 52-80; only need ONE of the following:    Colonoscopy   Covered every 10 years    Covered every 2 years if patient is at h

## 2021-12-13 NOTE — PATIENT INSTRUCTIONS
Grazyna Cook's SCREENING SCHEDULE   Tests on this list are recommended by your physician but may not be covered, or covered at this frequency, by your insurer. Please check with your insurance carrier before scheduling to verify coverage.    PRE ([id number] & [id number]) covered once after 65 Prevnar 13: 10/09/2017    Banhsxakh43: 10/18/2018     No recommendations at this time    Hepatitis B One screening covered for patients with certain risk factors   -  No recommendations at this time    [facility]

## 2022-02-09 ENCOUNTER — OFFICE VISIT (OUTPATIENT)
Dept: INTERNAL MEDICINE CLINIC | Facility: CLINIC | Age: 72
End: 2022-02-09
Payer: MEDICARE

## 2022-02-09 VITALS
WEIGHT: 237.38 LBS | BODY MASS INDEX: 33.23 KG/M2 | HEIGHT: 71 IN | DIASTOLIC BLOOD PRESSURE: 72 MMHG | OXYGEN SATURATION: 98 % | HEART RATE: 72 BPM | SYSTOLIC BLOOD PRESSURE: 136 MMHG

## 2022-02-09 DIAGNOSIS — S91.109D OPEN WOUND OF TOE, SUBSEQUENT ENCOUNTER: ICD-10-CM

## 2022-02-09 DIAGNOSIS — M79.674 PAIN OF TOE OF RIGHT FOOT: Primary | ICD-10-CM

## 2022-02-09 PROCEDURE — 99214 OFFICE O/P EST MOD 30 MIN: CPT | Performed by: FAMILY MEDICINE

## 2022-02-09 RX ORDER — PREDNISONE 20 MG/1
TABLET ORAL
Qty: 14 TABLET | Refills: 0 | Status: SHIPPED | OUTPATIENT
Start: 2022-02-09

## 2022-04-25 RX ORDER — LISINOPRIL 40 MG/1
40 TABLET ORAL
Qty: 90 TABLET | Refills: 0 | Status: SHIPPED | OUTPATIENT
Start: 2022-04-25

## 2022-04-25 RX ORDER — DILTIAZEM HYDROCHLORIDE 300 MG/1
300 CAPSULE, COATED, EXTENDED RELEASE ORAL
Qty: 90 CAPSULE | Refills: 0 | Status: SHIPPED | OUTPATIENT
Start: 2022-04-25

## 2022-04-25 NOTE — TELEPHONE ENCOUNTER
Last visit- 02/09/2022 pain of toe of right foot     Last refill- 11/01/2021 lisinopril 40mg QTY90 1R    Last labs- 12/09/2021 vitamin b12, hemoglobin a1c, cmp, psa screen, lipid, cbc    No future appointments.

## 2022-04-25 NOTE — TELEPHONE ENCOUNTER
Last visit- 02/09/2022 pain of toe of right foot    Last refill- 11/01/2021 diltiazem 300mg QTY90 1R    Last labs- 12/09/2021 hemoglobin a1c, cmp, psa screen, lipid, cbc    No future appointments.

## 2022-07-29 DIAGNOSIS — I10 ESSENTIAL HYPERTENSION: ICD-10-CM

## 2022-07-29 RX ORDER — DILTIAZEM HYDROCHLORIDE 300 MG/1
300 CAPSULE, COATED, EXTENDED RELEASE ORAL
Qty: 90 CAPSULE | Refills: 0 | Status: SHIPPED | OUTPATIENT
Start: 2022-07-29

## 2022-07-29 RX ORDER — LISINOPRIL 40 MG/1
40 TABLET ORAL
Qty: 90 TABLET | Refills: 0 | Status: SHIPPED | OUTPATIENT
Start: 2022-07-29

## 2022-07-29 NOTE — TELEPHONE ENCOUNTER
Last visit- 02/09/2022 pain of toe of right foot    Last refill- 04/25/2022 lisinopril 40mg QTY90 0R    Last labs- 12/09/2021 vitamin b12, hemoglobin a1c, cmp, psa screen, lipid, cbc    No future appointments.

## 2022-07-29 NOTE — TELEPHONE ENCOUNTER
Last visit- 02/09/2022 pain of toe of right foot     Last refill- 04/25/2022 diltiazem er 300mg QTY90 0R    Last labs- 12/09/2021 vitamin b12, hemoglobin a1c, cmp, psa screen, lipid, cbc    No future appointments.

## 2022-09-21 ENCOUNTER — PATIENT MESSAGE (OUTPATIENT)
Dept: INTERNAL MEDICINE CLINIC | Facility: CLINIC | Age: 72
End: 2022-09-21

## 2022-09-22 NOTE — TELEPHONE ENCOUNTER
From: Ilir Lindquist  To: Ebony Felix DO  Sent: 9/21/2022 12:04 PM CDT  Subject: Covid Booster    I received my 3rd booster today. See attached card.

## 2022-10-12 ENCOUNTER — TELEPHONE (OUTPATIENT)
Dept: INTERNAL MEDICINE CLINIC | Facility: CLINIC | Age: 72
End: 2022-10-12

## 2022-10-12 DIAGNOSIS — R73.09 ELEVATED GLUCOSE: ICD-10-CM

## 2022-10-12 DIAGNOSIS — I10 ESSENTIAL HYPERTENSION: Primary | ICD-10-CM

## 2022-10-12 DIAGNOSIS — R73.02 IMPAIRED GLUCOSE TOLERANCE: ICD-10-CM

## 2022-10-12 DIAGNOSIS — Z12.5 SCREENING PSA (PROSTATE SPECIFIC ANTIGEN): ICD-10-CM

## 2022-10-12 DIAGNOSIS — Z00.00 ENCOUNTER FOR ANNUAL HEALTH EXAMINATION: ICD-10-CM

## 2022-10-12 DIAGNOSIS — R97.20 ELEVATED PSA: ICD-10-CM

## 2022-10-12 DIAGNOSIS — E78.00 ELEVATED CHOLESTEROL: ICD-10-CM

## 2022-10-12 NOTE — TELEPHONE ENCOUNTER
Future Appointments   Date Time Provider Louis Jimenez   12/9/2022  8:00 AM Suhas Galvan,  [facility]     Orders to    Enid Manzano         aware must fast no call back required

## 2022-10-25 DIAGNOSIS — I10 ESSENTIAL HYPERTENSION: ICD-10-CM

## 2022-10-25 RX ORDER — LISINOPRIL 40 MG/1
40 TABLET ORAL
Qty: 90 TABLET | Refills: 0 | Status: SHIPPED | OUTPATIENT
Start: 2022-10-25

## 2022-10-25 RX ORDER — DILTIAZEM HYDROCHLORIDE 300 MG/1
300 CAPSULE, COATED, EXTENDED RELEASE ORAL
Qty: 90 CAPSULE | Refills: 0 | Status: SHIPPED | OUTPATIENT
Start: 2022-10-25

## 2022-10-25 NOTE — TELEPHONE ENCOUNTER
Last visit- 02/09/2022 pain of toe of right foot     Last refill- 07/29/2022 diltiazem er 300mg QTY90 0R,  07/29/2022 lisinopril 40mg QTY90 0R    Last labs- 12/09/2021 vitamin b12, hemoglobin a1c, cmp, psa screen, lipid, cbc  Future Appointments   Date Time Provider Louis Jimenez   12/9/2022  8:00 AM Jerrica Casiano DO [facility]

## 2022-12-05 ENCOUNTER — LAB ENCOUNTER (OUTPATIENT)
Dept: LAB | Age: 72
End: 2022-12-05
Attending: FAMILY MEDICINE
Payer: MEDICARE

## 2022-12-05 DIAGNOSIS — E78.00 ELEVATED CHOLESTEROL: ICD-10-CM

## 2022-12-05 DIAGNOSIS — Z00.00 ENCOUNTER FOR ANNUAL HEALTH EXAMINATION: ICD-10-CM

## 2022-12-05 DIAGNOSIS — I10 ESSENTIAL HYPERTENSION: ICD-10-CM

## 2022-12-05 DIAGNOSIS — R73.09 ELEVATED GLUCOSE: ICD-10-CM

## 2022-12-05 DIAGNOSIS — Z12.5 SCREENING PSA (PROSTATE SPECIFIC ANTIGEN): ICD-10-CM

## 2022-12-05 DIAGNOSIS — R97.20 ELEVATED PSA: ICD-10-CM

## 2022-12-05 DIAGNOSIS — R73.02 IMPAIRED GLUCOSE TOLERANCE: ICD-10-CM

## 2022-12-05 LAB
ALBUMIN SERPL-MCNC: 3.6 G/DL (ref 3.4–5)
ALBUMIN/GLOB SERPL: 0.9 {RATIO} (ref 1–2)
ALP LIVER SERPL-CCNC: 54 U/L
ALT SERPL-CCNC: 56 U/L
ANION GAP SERPL CALC-SCNC: 9 MMOL/L (ref 0–18)
AST SERPL-CCNC: 31 U/L (ref 15–37)
BASOPHILS # BLD AUTO: 0.06 X10(3) UL (ref 0–0.2)
BASOPHILS NFR BLD AUTO: 1 %
BILIRUB SERPL-MCNC: 0.7 MG/DL (ref 0.1–2)
BUN BLD-MCNC: 21 MG/DL (ref 7–18)
BUN/CREAT SERPL: 18.4 (ref 10–20)
CALCIUM BLD-MCNC: 9.6 MG/DL (ref 8.5–10.1)
CHLORIDE SERPL-SCNC: 104 MMOL/L (ref 98–112)
CHOLEST SERPL-MCNC: 182 MG/DL (ref ?–200)
CO2 SERPL-SCNC: 25 MMOL/L (ref 21–32)
COMPLEXED PSA SERPL-MCNC: 3.12 NG/ML (ref ?–4)
CREAT BLD-MCNC: 1.14 MG/DL
DEPRECATED RDW RBC AUTO: 45.2 FL (ref 35.1–46.3)
EOSINOPHIL # BLD AUTO: 0.09 X10(3) UL (ref 0–0.7)
EOSINOPHIL NFR BLD AUTO: 1.6 %
ERYTHROCYTE [DISTWIDTH] IN BLOOD BY AUTOMATED COUNT: 12 % (ref 11–15)
EST. AVERAGE GLUCOSE BLD GHB EST-MCNC: 117 MG/DL (ref 68–126)
FASTING PATIENT LIPID ANSWER: YES
FASTING STATUS PATIENT QL REPORTED: YES
GFR SERPLBLD BASED ON 1.73 SQ M-ARVRAT: 69 ML/MIN/1.73M2 (ref 60–?)
GLOBULIN PLAS-MCNC: 3.9 G/DL (ref 2.8–4.4)
GLUCOSE BLD-MCNC: 146 MG/DL (ref 70–99)
HBA1C MFR BLD: 5.7 % (ref ?–5.7)
HCT VFR BLD AUTO: 47.5 %
HDLC SERPL-MCNC: 74 MG/DL (ref 40–59)
HGB BLD-MCNC: 16 G/DL
IMM GRANULOCYTES # BLD AUTO: 0.01 X10(3) UL (ref 0–1)
IMM GRANULOCYTES NFR BLD: 0.2 %
LDLC SERPL CALC-MCNC: 90 MG/DL (ref ?–100)
LYMPHOCYTES # BLD AUTO: 0.82 X10(3) UL (ref 1–4)
LYMPHOCYTES NFR BLD AUTO: 14.2 %
MCH RBC QN AUTO: 34.5 PG (ref 26–34)
MCHC RBC AUTO-ENTMCNC: 33.7 G/DL (ref 31–37)
MCV RBC AUTO: 102.4 FL
MONOCYTES # BLD AUTO: 0.77 X10(3) UL (ref 0.1–1)
MONOCYTES NFR BLD AUTO: 13.4 %
NEUTROPHILS # BLD AUTO: 4.01 X10 (3) UL (ref 1.5–7.7)
NEUTROPHILS # BLD AUTO: 4.01 X10(3) UL (ref 1.5–7.7)
NEUTROPHILS NFR BLD AUTO: 69.6 %
NONHDLC SERPL-MCNC: 108 MG/DL (ref ?–130)
OSMOLALITY SERPL CALC.SUM OF ELEC: 292 MOSM/KG (ref 275–295)
PLATELET # BLD AUTO: 166 10(3)UL (ref 150–450)
POTASSIUM SERPL-SCNC: 4.4 MMOL/L (ref 3.5–5.1)
PROT SERPL-MCNC: 7.5 G/DL (ref 6.4–8.2)
RBC # BLD AUTO: 4.64 X10(6)UL
SODIUM SERPL-SCNC: 138 MMOL/L (ref 136–145)
TRIGL SERPL-MCNC: 99 MG/DL (ref 30–149)
VLDLC SERPL CALC-MCNC: 16 MG/DL (ref 0–30)
WBC # BLD AUTO: 5.8 X10(3) UL (ref 4–11)

## 2022-12-05 PROCEDURE — 85025 COMPLETE CBC W/AUTO DIFF WBC: CPT

## 2022-12-05 PROCEDURE — 80053 COMPREHEN METABOLIC PANEL: CPT

## 2022-12-05 PROCEDURE — 83036 HEMOGLOBIN GLYCOSYLATED A1C: CPT

## 2022-12-05 PROCEDURE — 80061 LIPID PANEL: CPT

## 2022-12-05 PROCEDURE — 36415 COLL VENOUS BLD VENIPUNCTURE: CPT

## 2022-12-09 ENCOUNTER — OFFICE VISIT (OUTPATIENT)
Dept: INTERNAL MEDICINE CLINIC | Facility: CLINIC | Age: 72
End: 2022-12-09
Payer: MEDICARE

## 2022-12-09 VITALS
WEIGHT: 228.38 LBS | SYSTOLIC BLOOD PRESSURE: 112 MMHG | OXYGEN SATURATION: 98 % | RESPIRATION RATE: 18 BRPM | HEIGHT: 71 IN | DIASTOLIC BLOOD PRESSURE: 64 MMHG | BODY MASS INDEX: 31.97 KG/M2 | HEART RATE: 73 BPM

## 2022-12-09 DIAGNOSIS — Z00.00 ENCOUNTER FOR ANNUAL HEALTH EXAMINATION: Primary | ICD-10-CM

## 2022-12-09 DIAGNOSIS — I10 ESSENTIAL HYPERTENSION: ICD-10-CM

## 2022-12-09 DIAGNOSIS — R73.02 IMPAIRED GLUCOSE TOLERANCE: ICD-10-CM

## 2022-12-09 RX ORDER — MELOXICAM 15 MG/1
15 TABLET ORAL DAILY
Qty: 30 TABLET | Refills: 1 | Status: SHIPPED | OUTPATIENT
Start: 2022-12-09

## 2022-12-23 DIAGNOSIS — I10 ESSENTIAL HYPERTENSION: ICD-10-CM

## 2022-12-23 RX ORDER — DILTIAZEM HYDROCHLORIDE 300 MG/1
300 CAPSULE, COATED, EXTENDED RELEASE ORAL
Qty: 90 CAPSULE | Refills: 1 | Status: SHIPPED | OUTPATIENT
Start: 2022-12-23

## 2022-12-23 RX ORDER — LISINOPRIL 40 MG/1
40 TABLET ORAL
Qty: 90 TABLET | Refills: 1 | Status: SHIPPED | OUTPATIENT
Start: 2022-12-23

## 2022-12-23 NOTE — TELEPHONE ENCOUNTER
Last visit- 12/09/2022 cpe     Last refill- 10/25/2022 diltiazem er 300mg QTY90 0R,  10/25/2022 lisinopril 40mg QTY90 0R    Last labs- 12/05/2022 lipid, cmp, cbc, hemoglobin a1c, psa screen     No future appointments.

## 2023-04-14 ENCOUNTER — OFFICE VISIT (OUTPATIENT)
Dept: INTERNAL MEDICINE CLINIC | Facility: CLINIC | Age: 73
End: 2023-04-14
Payer: MEDICARE

## 2023-04-14 VITALS
SYSTOLIC BLOOD PRESSURE: 150 MMHG | BODY MASS INDEX: 33.6 KG/M2 | DIASTOLIC BLOOD PRESSURE: 80 MMHG | WEIGHT: 240 LBS | OXYGEN SATURATION: 97 % | HEART RATE: 91 BPM | HEIGHT: 71 IN

## 2023-04-14 DIAGNOSIS — M54.50 ACUTE RIGHT-SIDED LOW BACK PAIN WITHOUT SCIATICA: Primary | ICD-10-CM

## 2023-04-14 PROCEDURE — 99214 OFFICE O/P EST MOD 30 MIN: CPT | Performed by: FAMILY MEDICINE

## 2023-04-14 RX ORDER — PREDNISONE 20 MG/1
TABLET ORAL
Qty: 14 TABLET | Refills: 0 | Status: SHIPPED | OUTPATIENT
Start: 2023-04-14

## 2023-04-14 RX ORDER — HYDROCODONE BITARTRATE AND ACETAMINOPHEN 5; 325 MG/1; MG/1
1 TABLET ORAL EVERY 6 HOURS PRN
Qty: 20 TABLET | Refills: 0 | Status: SHIPPED | OUTPATIENT
Start: 2023-04-14

## 2023-04-14 RX ORDER — METHOCARBAMOL 750 MG/1
750 TABLET, FILM COATED ORAL 4 TIMES DAILY
Qty: 30 TABLET | Refills: 0 | Status: SHIPPED | OUTPATIENT
Start: 2023-04-14

## 2023-04-19 ENCOUNTER — PATIENT MESSAGE (OUTPATIENT)
Dept: INTERNAL MEDICINE CLINIC | Facility: CLINIC | Age: 73
End: 2023-04-19

## 2023-04-20 RX ORDER — METHOCARBAMOL 750 MG/1
750 TABLET, FILM COATED ORAL 4 TIMES DAILY
Qty: 30 TABLET | Refills: 0 | Status: SHIPPED | OUTPATIENT
Start: 2023-04-20

## 2023-04-20 NOTE — TELEPHONE ENCOUNTER
I sent additional Methocarbamol to his pharmacy in case he feels like it helped still. I would rec Tylenol 1000mg every 8 hours. He should search online for [facility] back stretches\" [employer] and [facility] I think both have good handout) and start doing some stretching too. He should update us on Monday.

## 2023-04-20 NOTE — TELEPHONE ENCOUNTER
From: Jami Klein  To: Evelyn Craft DO  Sent: 4/19/2023 5:50 PM CDT  Subject: Back Pain    Hello Dr. Sheldon Coleman,    My back is much better but not 100%, about 70-75%. The spasms have subsided and just experiencing back soreness. I will finish my Prednisone on Thursday and the muscle relaxer on Friday. I did not need to take the pain medication you prescribed. What would you recommend or prescribe to help get me closer to 100%? Thank you again for seeing me last Friday as I had relief on the weekend.     Marsha Oates

## 2023-05-11 NOTE — PATIENT INSTRUCTIONS
Prevention Guidelines, Men Ages 72 and Older  Screening tests and vaccines are an important part of managing your health. A screening test is done to find possible disorders or diseases in people who don't have any symptoms.  The goal is to find a disease Hepatitis C Men at increased risk for infection – talk with your healthcare provider At routine exams   High cholesterol or triglycerides All men in this age group At least every 5 years   HIV Men at increased risk for infection – talk with your healthcare Pneumococcal conjugate vaccine (PCV13) and pneumococcal polysaccharide vaccine (PPSV23) All men in this age group 1 dose of each vaccine   Tetanus/diphtheria/  pertussis (Td/Tdap) booster All men in this age group Td every 10 years, or Tdap if you will hav • Overweight (BMI ³25 but <30)   • Family history of diabetes   • Age 72 years or older   • History of gestational diabetes or birth of baby weighing more than 9 pounds     Covered at least every 3 years,         Glucose (mg/dL)   Date Value   11/16/2018 1 Prostate Cancer Screening      PSA  Annually to 75 then as needed or NINA annually to 75 PSA due on 11/16/2020  No results found for this or any previous visit.    Annually (age 48 or over), NINA not paid separately when covered E/M service is provided on jasen A link to the [employer]. This site has a lot of good information including definitions of the different types of Advance Directives.  It also has the State forms available on it's website for anyone to review and print using their home 15-Nov-2022

## 2023-05-15 ENCOUNTER — OFFICE VISIT (OUTPATIENT)
Dept: INTERNAL MEDICINE CLINIC | Facility: CLINIC | Age: 73
End: 2023-05-15
Payer: MEDICARE

## 2023-05-15 VITALS
BODY MASS INDEX: 32.66 KG/M2 | HEIGHT: 71 IN | SYSTOLIC BLOOD PRESSURE: 138 MMHG | OXYGEN SATURATION: 97 % | WEIGHT: 233.31 LBS | DIASTOLIC BLOOD PRESSURE: 78 MMHG | HEART RATE: 80 BPM | RESPIRATION RATE: 16 BRPM

## 2023-05-15 DIAGNOSIS — F41.9 ANXIETY: ICD-10-CM

## 2023-05-15 DIAGNOSIS — I10 ESSENTIAL HYPERTENSION: Primary | ICD-10-CM

## 2023-05-15 PROCEDURE — 99214 OFFICE O/P EST MOD 30 MIN: CPT | Performed by: FAMILY MEDICINE

## 2023-05-15 RX ORDER — DILTIAZEM HYDROCHLORIDE 300 MG/1
300 CAPSULE, COATED, EXTENDED RELEASE ORAL
Qty: 90 CAPSULE | Refills: 1 | Status: SHIPPED | OUTPATIENT
Start: 2023-05-15

## 2023-05-15 RX ORDER — LISINOPRIL 40 MG/1
40 TABLET ORAL
Qty: 90 TABLET | Refills: 1 | Status: SHIPPED | OUTPATIENT
Start: 2023-05-15

## 2023-05-15 RX ORDER — ALPRAZOLAM 0.5 MG/1
0.5 TABLET ORAL NIGHTLY PRN
Qty: 20 TABLET | Refills: 0 | Status: SHIPPED | OUTPATIENT
Start: 2023-05-15

## 2023-05-18 PROBLEM — Z86.0101 HISTORY OF ADENOMATOUS POLYP OF COLON: Status: ACTIVE | Noted: 2023-05-18

## 2023-05-18 PROBLEM — Z86.010 HISTORY OF ADENOMATOUS POLYP OF COLON: Status: ACTIVE | Noted: 2023-05-18

## 2023-06-15 ENCOUNTER — PATIENT MESSAGE (OUTPATIENT)
Dept: INTERNAL MEDICINE CLINIC | Facility: CLINIC | Age: 73
End: 2023-06-15

## 2023-06-15 NOTE — TELEPHONE ENCOUNTER
From: Mariana Blanchard  To: Geoff Sep, DO  Sent: 6/15/2023 5:54 AM CDT  Subject: Back Spasms-Left side    Jose Antonio Kay    I am experiencing severe back spasms on my left side, very similar to what occurred a couple of months ago on my right side, same symptoms. This occurred from over doing some yard work yesterday. I have several muscle relaxers from the last time that I started taking this am. I do not need the pain medication because I have several from the last episode. It seemed the Prednisone helped the best last time and was wondering if you can prescribe this for me again.     Thank Alexa Hernandes

## 2023-06-15 NOTE — TELEPHONE ENCOUNTER
LOV 5/15/23     Pt requesting prednisone for back spasm (Last ordered 4/14/23)    AMS - OV? Or would you order Prednisone?

## 2023-06-16 ENCOUNTER — OFFICE VISIT (OUTPATIENT)
Dept: INTERNAL MEDICINE CLINIC | Facility: CLINIC | Age: 73
End: 2023-06-16
Payer: MEDICARE

## 2023-06-16 VITALS
BODY MASS INDEX: 33.39 KG/M2 | SYSTOLIC BLOOD PRESSURE: 138 MMHG | HEIGHT: 71 IN | OXYGEN SATURATION: 97 % | RESPIRATION RATE: 17 BRPM | HEART RATE: 80 BPM | DIASTOLIC BLOOD PRESSURE: 74 MMHG | WEIGHT: 238.5 LBS

## 2023-06-16 DIAGNOSIS — M54.50 ACUTE LEFT-SIDED LOW BACK PAIN WITHOUT SCIATICA: Primary | ICD-10-CM

## 2023-06-16 PROCEDURE — 1125F AMNT PAIN NOTED PAIN PRSNT: CPT | Performed by: FAMILY MEDICINE

## 2023-06-16 PROCEDURE — 99213 OFFICE O/P EST LOW 20 MIN: CPT | Performed by: FAMILY MEDICINE

## 2023-06-16 RX ORDER — METHOCARBAMOL 750 MG/1
750 TABLET, FILM COATED ORAL 4 TIMES DAILY
Qty: 30 TABLET | Refills: 0 | Status: SHIPPED | OUTPATIENT
Start: 2023-06-16

## 2023-06-16 RX ORDER — PREDNISONE 20 MG/1
TABLET ORAL
Qty: 14 TABLET | Refills: 0 | Status: SHIPPED | OUTPATIENT
Start: 2023-06-16

## 2023-06-16 RX ORDER — PREDNISONE 20 MG/1
TABLET ORAL
Qty: 14 TABLET | Refills: 0 | OUTPATIENT
Start: 2023-06-16

## 2023-08-16 ENCOUNTER — PATIENT MESSAGE (OUTPATIENT)
Dept: INTERNAL MEDICINE CLINIC | Facility: CLINIC | Age: 73
End: 2023-08-16

## 2023-08-17 NOTE — TELEPHONE ENCOUNTER
From: Kevin Lewis  To: Rae Verde DO  Sent: 8/16/2023 5:27 PM CDT  Subject: Fredy Madden,    Do you recommend I get the RSV vaccine?     Thanks,  So Pisano

## 2023-08-17 NOTE — TELEPHONE ENCOUNTER
What is your recommendation for patient to have this?      Last visit with you 6/16/23    Last annual 12/9/22

## 2023-10-03 ENCOUNTER — TELEPHONE (OUTPATIENT)
Dept: INTERNAL MEDICINE CLINIC | Facility: CLINIC | Age: 73
End: 2023-10-03

## 2023-10-03 NOTE — TELEPHONE ENCOUNTER
Orders to     Edward          Pt aware to get labs done no sooner than 2 weeks prior to the appt.  Pt aware to fast.  No call back required.    Future Appointments   Date Time Provider Department Center   2/23/2024 10:00 AM Ellie Iglesias DO [facility]

## 2023-10-04 ENCOUNTER — PATIENT MESSAGE (OUTPATIENT)
Dept: INTERNAL MEDICINE CLINIC | Facility: CLINIC | Age: 73
End: 2023-10-04

## 2023-10-04 NOTE — TELEPHONE ENCOUNTER
From: Eren Reid  To: Kayden Johnson  Sent: 10/4/2023 10:53 AM CDT  Subject: Covid Vaccination     Just got my Covid Vaccination today. I noticed my RSV vaccination is not in my records. I received it the same day as my FLU vaccination.     Thanks,  Ama Holt

## 2023-11-15 RX ORDER — ALPRAZOLAM 0.5 MG/1
0.5 TABLET ORAL NIGHTLY PRN
Qty: 20 TABLET | Refills: 0 | Status: SHIPPED | OUTPATIENT
Start: 2023-11-15

## 2023-11-15 NOTE — TELEPHONE ENCOUNTER
Requested Prescriptions     Pending Prescriptions Disp Refills    ALPRAZolam (XANAX) 0.5 MG Oral Tab 20 tablet 0     Sig: Take 1 tablet (0.5 mg total) by mouth nightly as needed.        LOV:  6--acute visit     LAST CPE: 12-9-2022-physical    Last Labs: 12-5-2022--cbc,cmp,lipid,tsh    Last Refill: 5---20 tabs with 0 refills     Your appointments       Date & Time Appointment Department [facility])    Feb 23, 2024 10:00 AM CST Medicare Annual Well Visit with Armand Corrales DO [address] ([facility] /[facility])              [address]  [facility] [employer] IM/FM [place]  [address] [phone]

## 2023-11-27 ENCOUNTER — PATIENT MESSAGE (OUTPATIENT)
Dept: INTERNAL MEDICINE CLINIC | Facility: CLINIC | Age: 73
End: 2023-11-27

## 2023-12-01 NOTE — TELEPHONE ENCOUNTER
From: Zane Espitia  To: Salina Claude  Sent: 11/27/2023 3:06 PM CST  Subject: RSV and COVID Vaccines    Please update my records.  Thanks, Natalee Norman

## 2023-12-27 ENCOUNTER — TELEPHONE (OUTPATIENT)
Dept: ORTHOPEDICS CLINIC | Facility: CLINIC | Age: 73
End: 2023-12-27

## 2023-12-27 DIAGNOSIS — M54.50 LOW BACK PAIN, UNSPECIFIED BACK PAIN LATERALITY, UNSPECIFIED CHRONICITY, UNSPECIFIED WHETHER SCIATICA PRESENT: Primary | ICD-10-CM

## 2023-12-27 NOTE — TELEPHONE ENCOUNTER
Future Appointments   Date Time Provider Department Center   2/12/2024 11:00 AM Marcelo Esparza MD [facility]     Xray ordered, please schedule with patient.  Thank you

## 2024-02-02 DIAGNOSIS — Z00.00 ROUTINE GENERAL MEDICAL EXAMINATION AT A HEALTH CARE FACILITY: Primary | ICD-10-CM

## 2024-02-02 DIAGNOSIS — I10 ESSENTIAL HYPERTENSION: ICD-10-CM

## 2024-02-02 DIAGNOSIS — Z12.5 SCREENING PSA (PROSTATE SPECIFIC ANTIGEN): ICD-10-CM

## 2024-02-02 DIAGNOSIS — R73.02 IMPAIRED GLUCOSE TOLERANCE: ICD-10-CM

## 2024-02-02 RX ORDER — DILTIAZEM HYDROCHLORIDE 300 MG/1
300 CAPSULE, COATED, EXTENDED RELEASE ORAL
Qty: 90 CAPSULE | Refills: 1 | Status: SHIPPED | OUTPATIENT
Start: 2024-02-02

## 2024-02-02 RX ORDER — LISINOPRIL 40 MG/1
40 TABLET ORAL
Qty: 90 TABLET | Refills: 0 | Status: SHIPPED | OUTPATIENT
Start: 2024-02-02

## 2024-02-02 NOTE — TELEPHONE ENCOUNTER
Requested Prescriptions     Pending Prescriptions Disp Refills    lisinopril 40 MG Oral Tab 90 tablet 1     Sig: Take 1 tablet (40 mg total) by mouth once daily.    dilTIAZem  MG Oral Capsule SR 24 Hr 90 capsule 1     Sig: Take 1 capsule (300 mg total) by mouth once daily.       LOV:6/16/23 AMS    LAST CPE:12/9/22 AMS ( due for annual, labs pended)    Last Labs:12/5/22 psa,A1c,cbc,cmp & lipid    Last Refill:  Medication Quantity Refills Start End   dilTIAZem  MG Oral Capsule SR 24 Hr 90 capsule 1 5/15/2023 --   Sig:   Take 1 capsule (300 mg total) by mouth once daily.     Route:   Oral     Order #:   [id number]       Medication Quantity Refills Start End   lisinopril 40 MG Oral Tab 90 tablet 1 5/15/2023 --   Sig:   Take 1 tablet (40 mg total) by mouth once daily.     Route:   Oral     Order #:   [id number]         Your appointments       Date & Time Appointment Department ([place])    Feb 12, 2024 10:30 AM CST XRAY LUMBAR SPINE with [address] X-ray [facility] ([address])        Feb 12, 2024 11:00 AM CST New Patient Visit with Marcelo Esparza MD [address] ([facility])    Please arrive 15 minutes prior to your scheduled appointment. Please also bring your Insurance card, Photo ID, and your medication bottles or a list of your current medication.    If your condition improves and this appointment is no longer needed, please contact your physician office to cancel.    Please verify with your primary care provider if your insurance requires a referral.        Feb 23, 2024 10:00 AM CST Medicare Annual Well Visit with Ellie Iglesias DO [address] ([facility] IM/[facility])              [facility] X-ray [address]  [address] [place] [address]  [phone] [address]  [facility] IM/FM  [place]  [address] [place] [address] 201  [facility] [id number] [phone] [facility], [address] [place] [address], [facility]  [address] [place] [address] 101  [facility] [id number] [phone]

## 2024-02-12 ENCOUNTER — HOSPITAL ENCOUNTER (OUTPATIENT)
Dept: GENERAL RADIOLOGY | Age: 74
Discharge: HOME | End: 2024-02-12
Attending: STUDENT IN AN ORGANIZED HEALTH CARE EDUCATION/TRAINING PROGRAM
Payer: MEDICARE

## 2024-02-12 ENCOUNTER — OFFICE VISIT (OUTPATIENT)
Dept: ORTHOPEDICS CLINIC | Facility: CLINIC | Age: 74
End: 2024-02-12
Payer: MEDICARE

## 2024-02-12 VITALS — WEIGHT: 238 LBS | BODY MASS INDEX: 33.32 KG/M2 | HEIGHT: 71 IN

## 2024-02-12 DIAGNOSIS — M54.17 LUMBOSACRAL RADICULOPATHY AT L5: Primary | ICD-10-CM

## 2024-02-12 DIAGNOSIS — M54.50 LOW BACK PAIN, UNSPECIFIED BACK PAIN LATERALITY, UNSPECIFIED CHRONICITY, UNSPECIFIED WHETHER SCIATICA PRESENT: ICD-10-CM

## 2024-02-12 PROCEDURE — 72100 X-RAY EXAM L-S SPINE 2/3 VWS: CPT | Performed by: STUDENT IN AN ORGANIZED HEALTH CARE EDUCATION/TRAINING PROGRAM

## 2024-02-12 PROCEDURE — 99205 OFFICE O/P NEW HI 60 MIN: CPT | Performed by: STUDENT IN AN ORGANIZED HEALTH CARE EDUCATION/TRAINING PROGRAM

## 2024-02-12 NOTE — H&P
[facility] - ORTHOPEDICS  [address], [address], [place] [address]  [address] [place] [address]  [phone]     NEW PATIENT VISIT - HISTORY AND PHYSICAL EXAMINATION     Name: Yohan Cook   MRN: [id number]  Date: 02/12/24       CC: bilateral foot numbnes    REFERRED BY: Ellie Iglesias DO    HPI:   Yohan Cook is a very pleasant 73 year old male who presents today for evaluation of  bilateral foot numbness .  Patient has history of L5-S1 decompression dating back to the early 1990s.  Patient symptoms started in July 2023 low back pain.  Patient was referred to physical therapy and had manipulation started developing bilateral foot numbness right worse than left.  Patient recently had an EMG demonstrating L5 radiculopathy.  Patient has had 2 epidural steroid injections slight improvement.  Patient also has tried Lyrica.    Prior spine surgery: L5-S1 decompression    Bowel and bladder symptoms: absent.    The patient has not had issues with balance and/or hand dexterity problems such as changes in penmanship or the use of buttons or zippers.    Treatment up to this time has included:    Evaluation: PCP and other spine surgeon  NSAIDS: have not been helpful  Narcotic use: None  Physical therapy: July 2023  Spinal injections: LESIx2  Others:       PMH:   Past Medical History:   Diagnosis Date    Arthritis     Back pain     Gout     Hypertension     Multiple allergies     S/P arthroscopy of right shoulder/DCE, SAD & labral debridment sx 2-27-20/Global 5-25-20/TRK/EP 02/27/2020    Tendinitis of right rotator cuff 09/17/2019    Wears glasses        PAST SURGICAL HX:  Past Surgical History:   Procedure Laterality Date    APPENDECTOMY      BACK SURGERY      COLONOSCOPY      SHOULDER ARTHROSCOPY Right 02/27/2020       FAMILY HX:  Family History   Problem Relation Age of Onset    Prostate Cancer Father     Hypertension Father     Heart Disease Father     Other  (Non-hodgkins lymphoma) Father     Hypertension Mother     Heart Attack Mother     Heart Disease Mother     Other (Non-hodgkins lymphoma) Other         Sibllings    Lung Disorder Other         COPD  Siblings    Other (Kidney stones) Other         Siblings    Depression Other         Siblings       ALLERGIES:  Patient has no known allergies.    MEDICATIONS:   Current Outpatient Medications   Medication Sig Dispense Refill    lisinopril 40 MG Oral Tab Take 1 tablet (40 mg total) by mouth once daily. 90 tablet 0    dilTIAZem  MG Oral Capsule SR 24 Hr Take 1 capsule (300 mg total) by mouth once daily. 90 capsule 1    ALPRAZolam (XANAX) 0.5 MG Oral Tab Take 1 tablet (0.5 mg total) by mouth nightly as needed. 20 tablet 0    Multiple Vitamins-Minerals (MULTI-VITAMIN/MINERALS) Oral Tab Take 1 tablet by mouth daily.         ROS: A comprehensive 14 point review of systems was performed and was negative aside from the aforementioned per history of present illness.    SOCIAL HX:  Social History     Tobacco Use    Smoking status: Never    Smokeless tobacco: Never   Substance Use Topics    Alcohol use: Yes     Alcohol/week: 14.0 standard drinks of alcohol     Types: 10 Glasses of wine, 4 Cans of beer per week           PE:   Vitals:    02/12/24 1103   Weight: 238 lb (108 kg)   Height: 5' 11\" (1.803 m)     Estimated body mass index is 33.19 kg/m² as calculated from the following:    Height as of this encounter: 5' 11\" (1.803 m).    Weight as of this encounter: 238 lb (108 kg).    Physical Exam  Constitutional:       Appearance: Normal appearance.   HENT:      Head: Normocephalic and atraumatic.   Eyes:      Extraocular Movements: Extraocular movements intact.   Cardiovascular:      Pulses: Normal pulses. Skin warm and well perfused.  Pulmonary:      Effort: Pulmonary effort is normal. No respiratory distress.   Skin:     General: Skin is warm.   Psychiatric:         Mood and Affect: Mood normal.     Spine Exam:    Normal  gait without difficulty  Able to heel, toe, tandem gait without difficulty  Level shoulders and hips in even stance    Restricted L-spine ROM    No tenderness to palpation of L-spine    Straight leg raise test: negative    Sustained clonus: negative    LE Strength: 5/5 IP QUAD TA EHL GSC  LE Sensation: decreased over dorsum foot bilaterally, otherwise in L2-S1 distribution  LE reflexes: normal    Radiographic Examination/Diagnostics:  XR  personally viewed, independently interpreted and radiology report was reviewed.  X-ray of the lumbar spine demonstrates multilevel degenerative changes including moderate degenerative disc disease L5-S1    MRI not available for review, per report    1. Suggestion of right-sided laminotomy changes at L5-S1 there is posterior ridging and diffuse disc   bulge with a right paracentral disc protrusion at L5-S1 with mass effect on the traversing right S1   nerve root. Mild bilateral neural foraminal narrowing is also noted.   2. Mild to moderate spinal canal stenosis with mild bilateral neural foraminal narrowing at L4-L5.   3. Mild to moderate spinal canal stenosis with mild left-sided neural foraminal narrowing at L3-L4.   4. Mild spinal canal stenosis without neural foraminal narrowing at L2-L3.       IMPRESSION: Yohan Cook is a 73 year old male with L5 radiculopathy    PLAN:     We reviewed the patients history, symptoms, exam findings, and imaging today.  We had a detailed discussion outlining the etiology, anatomy, pathophysiology, and natural history of lumbar radiculoapthy. The typical management of this condition may include lifestyle modification, NSAIDs, physical therapy, oral steroids, epidural injections, neuromodulatory medications, and sometimes pain medications.  Based on our discussion today we would like to have the patient initiate our recommendations for continued conservative therapy in the treatment of their condition noted in the assessment section.     -  Discussed operative and nonoperative treatment options  - Referred patient to Dr. Jones for additional medical management including possible duloxetine, amitriptyline and gabapentin. Discussed case w Dr. Jones  - Possible decompressive surgery if treatment fails. Advised patient to bring in MRI for review    FOLLOW-UP:  We will see him back in follow-up in 2 months, or sooner if any problems arise. Patient understands and agrees with plan.      Marcelo Esparza MD  Orthopedic Spine Surgeon  [facility] Orth[facility]   [address] [place] [address].[facility]  [email or web address]  t: [phone]   f: [phone]        This note was dictated using Dragon software.  While it was briefly proofread prior to completion, some grammatical, spelling, and word choice errors due to dictation may still occur.

## 2024-02-14 ENCOUNTER — OFFICE VISIT (OUTPATIENT)
Dept: PHYSICAL MEDICINE AND REHAB | Facility: CLINIC | Age: 74
End: 2024-02-14
Payer: MEDICARE

## 2024-02-14 VITALS
BODY MASS INDEX: 33.32 KG/M2 | OXYGEN SATURATION: 98 % | HEIGHT: 71 IN | WEIGHT: 238 LBS | HEART RATE: 68 BPM | RESPIRATION RATE: 18 BRPM

## 2024-02-14 DIAGNOSIS — R20.0 NUMBNESS OF FOOT: ICD-10-CM

## 2024-02-14 DIAGNOSIS — G62.9 PERIPHERAL POLYNEUROPATHY: Primary | ICD-10-CM

## 2024-02-14 PROCEDURE — 99204 OFFICE O/P NEW MOD 45 MIN: CPT | Performed by: PHYSICAL MEDICINE & REHABILITATION

## 2024-02-14 RX ORDER — GABAPENTIN 300 MG/1
300 CAPSULE ORAL 3 TIMES DAILY
Qty: 90 CAPSULE | Refills: 0 | Status: SHIPPED | OUTPATIENT
Start: 2024-02-14 | End: 2024-03-15

## 2024-02-14 NOTE — PROGRESS NOTES
NEW PATIENT VISIT    CHIEF COMPLAINT  Bilateral foot numbness      HISTORY OF PRESENTING ILLNESS  Yohan Cook is a 73 year old male who presents for evaluation of bilateral foot numbness.  Patient is referred to my office orthopedic spine surgery Dr. Marcelo Esparza.  Patient has undergone spinal decompression at L5-S1 in the early 90s and has been doing well however in the summer 2023 noted some low back pain.  Participated in physical therapy and then started to note onset of bilateral foot numbness.     Most of his complaint is numbess in the top of the feet. No weakness. No pain per se.  He was seen briefly by a physiatrist at [facility], referred to physical therapy, had an MRI ordered which was reviewed in full below as well as an EMG.    EMG was read as an active right-sided L5 radiculopathy as well as evidence of CIDP.    He has not seen neurology.    Medications: Lyrica (no help)    Injections: ESIs X2 without any significant help.     PAST MEDICAL HISTORY  Past Medical History:   Diagnosis Date    Arthritis     Back pain     Essential hypertension     Gout     Hypertension     Multiple allergies     S/P arthroscopy of right shoulder/DCE, SAD & labral debridment sx 2-27-20/Global 5-25-20/TRK/EP 02/27/2020    Tendinitis of right rotator cuff 09/17/2019    Wears glasses        PAST SURGICAL HISTORY  Past Surgical History:   Procedure Laterality Date    APPENDECTOMY      BACK SURGERY      COLONOSCOPY      SHOULDER ARTHROSCOPY Right 02/27/2020       MEDICATIONS  Current Outpatient Medications on File Prior to Visit   Medication Sig Dispense Refill    lisinopril 40 MG Oral Tab Take 1 tablet (40 mg total) by mouth once daily. 90 tablet 0    dilTIAZem  MG Oral Capsule SR 24 Hr Take 1 capsule (300 mg total) by mouth once daily. 90 capsule 1    ALPRAZolam (XANAX) 0.5 MG Oral Tab Take 1 tablet (0.5 mg total) by mouth nightly as needed. 20 tablet 0    Multiple Vitamins-Minerals (MULTI-VITAMIN/MINERALS) Oral Tab  Take 1 tablet by mouth daily.       No current facility-administered medications on file prior to visit.       ALLERGIES  No Known Allergies    SOCIAL HISTORY   reports that he has never smoked. He has never used smokeless tobacco. He reports current alcohol use of about 8.0 standard drinks of alcohol per week. He reports that he does not use drugs.    FAMILY HISTORY  Family History   Problem Relation Age of Onset    Prostate Cancer Father     Hypertension Father     Heart Disease Father     Other (Non-hodgkins lymphoma) Father     Hypertension Mother     Heart Attack Mother     Heart Disease Mother     Other (Non-hodgkins lymphoma) Other         Sibllings    Lung Disorder Other         COPD  Siblings    Other (Kidney stones) Other         Siblings    Depression Other         Siblings    Heart Disease Sister        REVIEW OF SYSTEMS  Complete review of systems was performed and was negative except for those items stated in the History of Presenting Illness and Past Medical/Surgical History.    PHYSICAL EXAMINATION  GENERAL:  In no acute distress. Well-developed and well nourished.   SKIN: No rashes or open wounds involving posterior torso, posterior pelvis, lower extremities.  NEUROLOGIC:   Strength: Well-maintained strength on antalgic gait reflexes: intact and symmetric in bilateral lower extremities. Babinski downgoing bilaterally. No clonus.   Gait: able to heel walk, toe walk, and perform tandem gait.   MUSCULOSKELETAL:  Inspection: Normal alignment of lumbar spine. No shift or scoliosis. Normal posture. Equal iliac crest heights. No pelvic asymmetry.   Palpation: No tenderness to palpation  ROM: Active lumbar spine ROM is within normal limits. Passive bilateral hip ROM is full and pain-free.  Special Tests:   Neural tension testing negative  Decree sensation in the dorsal aspect of the bilateral feet.    REVIEW OF PRIOR X-RAYS/STUDIES  MRI images independently reviewed, these are available in PACS.    There is  a diffuse disc bulge paracentral to the right with suspected mass effect on the S1 nerve root.  There is moderate central canal and bilateral neuroforaminal narrowing at L3-4 and L4-5.  Appearance of epidural lipomatosis as well.    IMPRESSION/DIAGNOSIS  Encounter Diagnoses   Name Primary?    Peripheral polyneuropathy Yes    Numbness of foot        TREATMENT/PLAN  Unclear etiology, patient with evidence of active right-sided radiculopathy on outside EMG however his pain distribution is bilateral, also, provide CIDP  Outside EMG.  Will refer to neurology for them to investigate this further.  In addition patient will get started on gabapentin for symptomatic treatment.  Consideration for repeat lumbar intervention would be an L4-5 interlaminar epidural steroid injection however at this time we will try medication first.  He will follow-up with me after about a month to track his progress on this med.    Education was provided regarding the above impression/diagnosis and treatment options/plan were discussed.  All questions were answered during today's visit.  Patient will contact clinic if any other questions or concerns.          Junito Jones DO  Physical Medicine and Rehabilitation  Interventional Spine and Sports Medicine   [facility]

## 2024-02-14 NOTE — PATIENT INSTRUCTIONS
Please start gabapentin titration.    DAYS  AM  NOON  PM   1-4 - - 300mg   5-8 300mg - 300mg   9+ 300mg 300mg 300mg

## 2024-02-19 ENCOUNTER — LABORATORY ENCOUNTER (OUTPATIENT)
Dept: LAB | Age: 74
End: 2024-02-19
Attending: FAMILY MEDICINE
Payer: MEDICARE

## 2024-02-19 DIAGNOSIS — Z00.00 ROUTINE GENERAL MEDICAL EXAMINATION AT A HEALTH CARE FACILITY: ICD-10-CM

## 2024-02-19 DIAGNOSIS — Z12.5 SCREENING PSA (PROSTATE SPECIFIC ANTIGEN): ICD-10-CM

## 2024-02-19 DIAGNOSIS — R73.02 IMPAIRED GLUCOSE TOLERANCE: ICD-10-CM

## 2024-02-19 LAB
ALBUMIN SERPL-MCNC: 3.5 G/DL (ref 3.4–5)
ALBUMIN/GLOB SERPL: 0.9 {RATIO} (ref 1–2)
ALP LIVER SERPL-CCNC: 62 U/L
ALT SERPL-CCNC: 51 U/L
ANION GAP SERPL CALC-SCNC: 4 MMOL/L (ref 0–18)
AST SERPL-CCNC: 28 U/L (ref 15–37)
BASOPHILS # BLD AUTO: 0.08 X10(3) UL (ref 0–0.2)
BASOPHILS NFR BLD AUTO: 1.5 %
BILIRUB SERPL-MCNC: 0.4 MG/DL (ref 0.1–2)
BUN BLD-MCNC: 10 MG/DL (ref 9–23)
CALCIUM BLD-MCNC: 9.6 MG/DL (ref 8.5–10.1)
CHLORIDE SERPL-SCNC: 107 MMOL/L (ref 98–112)
CHOLEST SERPL-MCNC: 217 MG/DL (ref ?–200)
CO2 SERPL-SCNC: 28 MMOL/L (ref 21–32)
COMPLEXED PSA SERPL-MCNC: 3.49 NG/ML (ref ?–4)
CREAT BLD-MCNC: 0.96 MG/DL
EGFRCR SERPLBLD CKD-EPI 2021: 85 ML/MIN/1.73M2 (ref 60–?)
EOSINOPHIL # BLD AUTO: 0.15 X10(3) UL (ref 0–0.7)
EOSINOPHIL NFR BLD AUTO: 2.9 %
ERYTHROCYTE [DISTWIDTH] IN BLOOD BY AUTOMATED COUNT: 12.1 %
EST. AVERAGE GLUCOSE BLD GHB EST-MCNC: 117 MG/DL (ref 68–126)
FASTING PATIENT LIPID ANSWER: YES
FASTING STATUS PATIENT QL REPORTED: YES
GLOBULIN PLAS-MCNC: 3.9 G/DL (ref 2.8–4.4)
GLUCOSE BLD-MCNC: 130 MG/DL (ref 70–99)
HBA1C MFR BLD: 5.7 % (ref ?–5.7)
HCT VFR BLD AUTO: 47.5 %
HDLC SERPL-MCNC: 75 MG/DL (ref 40–59)
HGB BLD-MCNC: 16.4 G/DL
IMM GRANULOCYTES # BLD AUTO: 0.01 X10(3) UL (ref 0–1)
IMM GRANULOCYTES NFR BLD: 0.2 %
LDLC SERPL CALC-MCNC: 120 MG/DL (ref ?–100)
LYMPHOCYTES # BLD AUTO: 1.68 X10(3) UL (ref 1–4)
LYMPHOCYTES NFR BLD AUTO: 32.3 %
MCH RBC QN AUTO: 35.4 PG (ref 26–34)
MCHC RBC AUTO-ENTMCNC: 34.5 G/DL (ref 31–37)
MCV RBC AUTO: 102.6 FL
MONOCYTES # BLD AUTO: 0.65 X10(3) UL (ref 0.1–1)
MONOCYTES NFR BLD AUTO: 12.5 %
NEUTROPHILS # BLD AUTO: 2.63 X10 (3) UL (ref 1.5–7.7)
NEUTROPHILS # BLD AUTO: 2.63 X10(3) UL (ref 1.5–7.7)
NEUTROPHILS NFR BLD AUTO: 50.6 %
NONHDLC SERPL-MCNC: 142 MG/DL (ref ?–130)
OSMOLALITY SERPL CALC.SUM OF ELEC: 289 MOSM/KG (ref 275–295)
PLATELET # BLD AUTO: 170 10(3)UL (ref 150–450)
POTASSIUM SERPL-SCNC: 4.5 MMOL/L (ref 3.5–5.1)
PROT SERPL-MCNC: 7.4 G/DL (ref 6.4–8.2)
RBC # BLD AUTO: 4.63 X10(6)UL
SODIUM SERPL-SCNC: 139 MMOL/L (ref 136–145)
TRIGL SERPL-MCNC: 128 MG/DL (ref 30–149)
VLDLC SERPL CALC-MCNC: 22 MG/DL (ref 0–30)
WBC # BLD AUTO: 5.2 X10(3) UL (ref 4–11)

## 2024-02-19 PROCEDURE — 80061 LIPID PANEL: CPT

## 2024-02-19 PROCEDURE — 36415 COLL VENOUS BLD VENIPUNCTURE: CPT

## 2024-02-19 PROCEDURE — 80053 COMPREHEN METABOLIC PANEL: CPT

## 2024-02-19 PROCEDURE — 85025 COMPLETE CBC W/AUTO DIFF WBC: CPT

## 2024-02-19 PROCEDURE — 83036 HEMOGLOBIN GLYCOSYLATED A1C: CPT

## 2024-02-21 ENCOUNTER — OFFICE VISIT (OUTPATIENT)
Dept: NEUROLOGY | Facility: CLINIC | Age: 74
End: 2024-02-21
Payer: MEDICARE

## 2024-02-21 VITALS
DIASTOLIC BLOOD PRESSURE: 68 MMHG | BODY MASS INDEX: 32 KG/M2 | RESPIRATION RATE: 16 BRPM | WEIGHT: 229.25 LBS | HEART RATE: 81 BPM | SYSTOLIC BLOOD PRESSURE: 158 MMHG

## 2024-02-21 DIAGNOSIS — G62.9 NEUROPATHY: Primary | ICD-10-CM

## 2024-02-21 PROCEDURE — 99204 OFFICE O/P NEW MOD 45 MIN: CPT | Performed by: OTHER

## 2024-02-21 NOTE — PROGRESS NOTES
Pt reports sudden onset of numbness in both feet up to ankle.  Started during PT in July of 2023.

## 2024-02-21 NOTE — H&P
Neurology H&P    Yohan Cook Patient Status:  No patient class for patient encounter    1950 MRN [id number]   Location [facility], [address] [place] [address] Attending No att. providers found   Hosp Day # 0 PCP Ellie Iglesias DO     Subjective:  Yohan Cook is a(n) 73 year old male with a past medical history significant for HTN, back pain, gout, and status post lumbar laminectomy. He states that he has had 2 previous lumbar surgeries. He states that he went to PT in . He states that during PT he was pushing and exercising and that he developed numbness and tingling in the feet. He states that this was R>L. He went to his PCP and eventually PM&R and orthopedic surgery over time. He has had lumbar steroid injections. He eventually had an EMG done and this was reported as showing possibly CIDP and also a lumbar radiculopathy. He states that he has only had numbness in the feet. It has not gotten any worse or climbed up higher than the low ankles. He denies any radiating low back pains. He states that he has not had any weakness in the legs or feeling of his legs giving out.     Current Medications:  Current Outpatient Medications   Medication Sig Dispense Refill    gabapentin 300 MG Oral Cap Take 1 capsule (300 mg total) by mouth 3 (three) times daily. 90 capsule 0    lisinopril 40 MG Oral Tab Take 1 tablet (40 mg total) by mouth once daily. 90 tablet 0    dilTIAZem  MG Oral Capsule SR 24 Hr Take 1 capsule (300 mg total) by mouth once daily. 90 capsule 1    ALPRAZolam (XANAX) 0.5 MG Oral Tab Take 1 tablet (0.5 mg total) by mouth nightly as needed. 20 tablet 0    Multiple Vitamins-Minerals (MULTI-VITAMIN/MINERALS) Oral Tab Take 1 tablet by mouth daily.         Problem List:  Patient Active Problem List   Diagnosis    Essential hypertension    Impaired glucose tolerance    History of adenomatous polyp of colon       PMHx:  Past Medical History:   Diagnosis  Date    Arthritis     Back pain     Essential hypertension     Gout     Hypertension     Multiple allergies     S/P arthroscopy of right shoulder/DCE, SAD & labral debridment sx 2-27-20/Global 5-25-20/TRK/EP 02/27/2020    Tendinitis of right rotator cuff 09/17/2019    Wears glasses        PSHx:  Past Surgical History:   Procedure Laterality Date    APPENDECTOMY      BACK SURGERY      COLONOSCOPY      SHOULDER ARTHROSCOPY Right 02/27/2020       SocHx:  Social History     Socioeconomic History    Marital status:    Tobacco Use    Smoking status: Never    Smokeless tobacco: Never   Substance and Sexual Activity    Alcohol use: Yes     Alcohol/week: 8.0 standard drinks of alcohol     Types: 4 Glasses of wine, 4 Cans of beer per week    Drug use: No   Other Topics Concern    Caffeine Concern No    Exercise No    Seat Belt No    Special Diet No    Stress Concern No    Weight Concern No       Family History:  Family History   Problem Relation Age of Onset    Prostate Cancer Father     Hypertension Father     Heart Disease Father     Other (Non-hodgkins lymphoma) Father     Hypertension Mother     Heart Attack Mother     Heart Disease Mother     Other (Non-hodgkins lymphoma) Other         Sibllings    Lung Disorder Other         COPD  Siblings    Other (Kidney stones) Other         Siblings    Depression Other         Siblings    Heart Disease Sister            ROS:  10 point ROS completed and was negative, except for pertinent positive and negatives stated in subjective.    Objective/Physical Exam:    Vital Signs:  There were no vitals taken for this visit.    Gen: Awake and in no apparent distress  HEENT: moist mucus membranes  Neck: Supple  Cardiovascular: Regular rate and rhythm, no murmur  Pulm: CTAB  GI: non-tender, normal bowel sounds  Skin: normal, dry  Extremities: No clubbing or cyanosis      Neurologic:   MENTAL STATUS: alert, ox3, normal attention, language and fund of knowledge.      CRANIAL NERVES II to  XII: PERRLA, no ptosis or diplopia, EOM intact, facial sensation intact, strong eye closure, face is symmetric, no dysarthria, tongue midline,  no tongue fasciculations or atrophy, strong shoulder shrug.    MOTOR EXAMINATION: normal tone, no fasciculations, normal strength throughout in UEs and LEs      SENSORY EXAMINATION:  UE: intact to light touch, pinprick intact  LE: intact to light touch, pinprick intact    COORDINATION:  No dysmetria, or intention tremors;     REFLEXES: 2+ at biceps, 2+ brachioradialis, 2+ at patella, 2+ at the ankles     GAIT: normal stance, normal toe gait and heel gait, tandem well.    Romberg's: + sway        Labs:       Imaging:  EMG/NCS 2/6/24  Lower  extremities were warmed to greater than 31 C with hydrocolloid packs  The bilateral sural  and left superficial peroneal sensory nerve conduction studies revealed no response.    The right superficial peroneal study revealed slow peak latencies and slightly small amplitudes.  The left ulnar sensory nerve conduction studies revealed slightly slow peak latencies and normal amplitudes.    The left median and radial normal peak latencies and normal amplitudes.  The right  tibial and peroneal motor nerve conduction studies revealed slightly prolonged distal latencies, small amplitudes, and slow conduction velocities.   The left tibial and peroneal motor nerve conduction studies revealed borderline distal latencies, normal amplitudes, and slow conduction velocities.  The left median and ulnar motor studies were normal.   The needle EMG examination was performed in selected muscles of the left C5-T1 and  bilateral L2-S1 myotomes.  The right tibialis anterior and short head of bicep revealed abnormal spontaneous activity and abnormal motor unit morphology with reduced recruitment. All other  muscles tested revealed no abnormal spontaneous activity, normal motor unit morphology, and normal recruitment patterns.                   Conclusion:       This is an abnormal study.    There is electrodiagnostic evidence of a right L5 radiculopathy with ongoing denervation.  There is also electrodiagnostic evidence of a sensorimotor demyelinating neuropathy, consistent with CIDP.     Assessment:  This is a 73-year-old male with a history of lumbar radiculopathy status post lumbar laminectomy and numbness in his feet.  His exam as noted above.  He has no specific sensory loss on exam but I can see today.  He does report subjective numbness in the feet.  Overall I do not feel his symptoms are representative of CIDP as suggested by the EMG report.  Per clinical history he was doing fine until he was exercising with PT did something during the exercise movement that caused his feet to suddenly go kind of numb and tingly.  This would not be consistent with GBS or progression into CIDP. Also per EMG review his distal latencies were only minimally prolonged and nerves showing significant decrease in conduction velocity also showed almost total obliteration of amplitudes which can also affect conduction velocity.  Overall I think he  possibly has a polyneuropathy in the legs in addition to his noted lumbar radiculopathy and he is status post 2 previous surgeries.  He has not complained of any back pain or radiating pain at this point in time to me.  His DTRs are brisk but he had no weakness. His main complaint is the numbness which is not really painful in his feet.  He was just started on gabapentin 300 mg nightly.  He should continue this at this point time.  Of course this can be increased slowly over time.  Other options would be Cymbalta nortriptyline and amitriptyline are Topamax etc.  I did offer to repeat the EMG nerve conduction study in the next several months he declines at this point time.  I also will send off lab tests for evaluation of toxic metabolic neuropathies.       Plan:  neuropathy  - A1c: 5.7  - ACE, B12, B6, TSH, SPEP, lyme, vitamin E  - EMG reports  reviewed  - Continue gabapentin    Yohan Velazquez,   Neurology   tender

## 2024-02-21 NOTE — PATIENT INSTRUCTIONS
After your visit at the [facility] today,  please direct any follow up questions or medication needs to the staff in our [place] office so that your concerns may be promptly addressed.  We are available through [employer] or at the numbers below:    The phone number is:   [phone] option #1    The fax number is:  [phone]    Your pharmacy should also send any requests electronically to the [place] office.  Refill policies:    Allow 2-3 business days for refills; controlled substances may take longer.  Contact your pharmacy at least 5 days prior to running out of medication and have them send an electronic request or submit request through the “request refill” option in your [employer] account.  Refills are not addressed on weekends; covering physicians do not authorize routine medications on weekends.  No narcotics or controlled substances are refilled after noon on Fridays or by on call physicians.  By law, narcotics must be electronically prescribed.  A 30 day supply with no refills is the maximum allowed.  If your prescription is due for a refill, you may be due for a follow up appointment.  To best provide you care, patients receiving routine medications need to be seen at least once a year.  Patients receiving narcotic/controlled substance medications need to be seen at least once every 3 months.  In the event that your preferred pharmacy does not have the requested medication in stock (e.g. Backordered), it is your responsibility to find another pharmacy that has the requested medication available.  We will gladly send a new prescription to that pharmacy at your request.    Scheduling Tests:    If your physician has ordered radiology tests such as MRI or CT scans, please contact Central Scheduling at [phone] right away to schedule the test.  Once scheduled, the [facility] Centralized Referral Team will work with your insurance carrier to obtain pre-certification or prior authorization.   Depending on your insurance carrier, approval may take 3-10 days.  It is highly recommended patients assure they have received an authorization before having a test performed.  If test is done without insurance authorization, patient may be responsible for the entire amount billed.      Precertification and Prior Authorizations:  If your physician has recommended that you have a procedure or additional testing performed the [facility] Centralized Referral Team will contact your insurance carrier to obtain pre-certification or prior authorization.    You are strongly encouraged to contact your insurance carrier to verify that your procedure/test has been approved and is a COVERED benefit.  Although the [facility] Centralized Referral Team does its due diligence, the insurance carrier gives the disclaimer that \"Although the procedure is authorized, this does not guarantee payment.\"    Ultimately the patient is responsible for payment.   Thank you for your understanding in this matter.  Paperwork Completion:  If you require FMLA or disability paperwork for your recovery, please make sure to either drop it off or have it faxed to our office at [phone]. Be sure the form has your name and date of birth on it.  The form will be faxed to our Forms Department and they will complete it for you.  There is a 25$ fee for all forms that need to be filled out.  Please be aware there is a 10-14 day turnaround time.  You will need to sign a release of information (MATIAS) form if your paperwork does not come with one.  You may call the Forms Department with any questions at [phone].  Their fax number is [phone].

## 2024-02-22 ENCOUNTER — LAB ENCOUNTER (OUTPATIENT)
Dept: LAB | Age: 74
End: 2024-02-22
Attending: Other
Payer: MEDICARE

## 2024-02-22 DIAGNOSIS — G62.9 NEUROPATHY: ICD-10-CM

## 2024-02-22 LAB
ERYTHROCYTE [SEDIMENTATION RATE] IN BLOOD: 35 MM/HR
VIT B12 SERPL-MCNC: 567 PG/ML (ref 193–986)

## 2024-02-22 PROCEDURE — 86225 DNA ANTIBODY NATIVE: CPT

## 2024-02-22 PROCEDURE — 36415 COLL VENOUS BLD VENIPUNCTURE: CPT

## 2024-02-22 PROCEDURE — 82164 ANGIOTENSIN I ENZYME TEST: CPT

## 2024-02-22 PROCEDURE — 84446 ASSAY OF VITAMIN E: CPT

## 2024-02-22 PROCEDURE — 82607 VITAMIN B-12: CPT

## 2024-02-22 PROCEDURE — 84207 ASSAY OF VITAMIN B-6: CPT

## 2024-02-22 PROCEDURE — 85652 RBC SED RATE AUTOMATED: CPT

## 2024-02-22 PROCEDURE — 86038 ANTINUCLEAR ANTIBODIES: CPT

## 2024-02-22 PROCEDURE — 86618 LYME DISEASE ANTIBODY: CPT

## 2024-02-23 ENCOUNTER — OFFICE VISIT (OUTPATIENT)
Dept: INTERNAL MEDICINE CLINIC | Facility: CLINIC | Age: 74
End: 2024-02-23
Payer: MEDICARE

## 2024-02-23 VITALS
DIASTOLIC BLOOD PRESSURE: 80 MMHG | SYSTOLIC BLOOD PRESSURE: 136 MMHG | BODY MASS INDEX: 32.06 KG/M2 | WEIGHT: 229 LBS | OXYGEN SATURATION: 95 % | HEART RATE: 85 BPM | HEIGHT: 71 IN

## 2024-02-23 DIAGNOSIS — R73.02 IMPAIRED GLUCOSE TOLERANCE: ICD-10-CM

## 2024-02-23 DIAGNOSIS — I10 ESSENTIAL HYPERTENSION: ICD-10-CM

## 2024-02-23 DIAGNOSIS — F41.9 ANXIETY: ICD-10-CM

## 2024-02-23 DIAGNOSIS — Z00.00 ENCOUNTER FOR ANNUAL HEALTH EXAMINATION: Primary | ICD-10-CM

## 2024-02-23 LAB
ACE: 27 U/L
B BURGDOR IGG+IGM SER QL: NEGATIVE
DSDNA IGG SERPL IA-ACNC: 0.8 IU/ML
ENA AB SER QL IA: <0.09 UG/L
ENA AB SER QL IA: NEGATIVE

## 2024-02-23 RX ORDER — ALPRAZOLAM 0.5 MG/1
0.5 TABLET ORAL NIGHTLY PRN
Qty: 20 TABLET | Refills: 0 | Status: SHIPPED
Start: 2024-02-23

## 2024-02-23 NOTE — PATIENT INSTRUCTIONS
Yohan Cook's SCREENING SCHEDULE   Tests on this list are recommended by your physician but may not be covered, or covered at this frequency, by your insurer.   Please check with your insurance carrier before scheduling to verify coverage.   PREVENTATIVE SERVICES FREQUENCY &  COVERAGE DETAILS LAST COMPLETION DATE   Diabetes Screening    Fasting Blood Sugar / Glucose    One screening every 12 months if never tested or if previously tested but not diagnosed with pre-diabetes   One screening every 6 months if diagnosed with pre-diabetes Lab Results   Component Value Date     (H) 02/19/2024        Cardiovascular Disease Screening    Lipid Panel  Cholesterol  Lipoprotein (HDL)  Triglycerides Covered every 5 years for all Medicare beneficiaries without apparent signs or symptoms of cardiovascular disease Lab Results   Component Value Date    CHOLEST 217 (H) 02/19/2024    HDL 75 (H) 02/19/2024     (H) 02/19/2024    TRIG 128 02/19/2024         Electrocardiogram (EKG)   Covered if needed at Welcome to Medicare, and non-screening if indicated for medical reasons 02/06/2020      Ultrasound Screening for Abdominal Aortic Aneurysm (AAA) Covered once in a lifetime for one of the following risk factors   • Men who are 65-75 years old and have ever smoked   • Anyone with a family history -     Colorectal Cancer Screening  Covered for ages 50-85; only need ONE of the following:    Colonoscopy   Covered every 10 years    Covered every 2 years if patient is at high risk or previous colonoscopy was abnormal 05/18/2023    Health Maintenance   Topic Date Due   • Colorectal Cancer Screening  05/18/2026       Flexible Sigmoidoscopy   Covered every 4 years -    Fecal Occult Blood Test Covered annually -   Prostate Cancer Screening    Prostate-Specific Antigen (PSA) Annually No results found for: \"PSA\"  Health Maintenance   Topic Date Due   • PSA  02/19/2026      Immunizations    Influenza Covered once per flu  season  Please get every year 09/13/2023  No recommendations at this time    Pneumococcal Each vaccine ([id number] & [id number]) covered once after 65 Prevnar 13: 10/09/2017    [id number]: 10/18/2018     No recommendations at this time    Hepatitis B One screening covered for patients with certain risk factors   -  No recommendations at this time    Tetanus Toxoid Not covered by Medicare Part B unless medically necessary (cut with metal); may be covered with your pharmacy prescription benefits 12/13/2021    Tetanus, Diptheria and Pertusis TD and TDaP Not covered by Medicare Part B -  No recommendations at this time    Zoster Not covered by Medicare Part B; may be covered with your pharmacy  prescription benefits 08/20/2012  No recommendations at this time     Annual Monitoring of Persistent Medications (ACE/ARB, digoxin diuretics, anticonvulsants)    Potassium Annually Lab Results   Component Value Date    K 4.5 02/19/2024         Creatinine   Annually Lab Results   Component Value Date    CREATSERUM 0.96 02/19/2024         BUN Annually Lab Results   Component Value Date    BUN 10 02/19/2024       Drug Serum Conc Annually No results found for: \"DIGOXIN\", \"DIG\", \"VALP\"

## 2024-02-23 NOTE — PROGRESS NOTES
Subjective:   Yohan Cook is a 73 year old male who presents for a Medicare Subsequent Annual Wellness visit (Pt already had Initial Annual Wellness) and scheduled follow up of multiple significant but stable problems.   1. Encounter for annual health examination (Primary)- doing ok. Is seeing a lot of doctors lately for his back and Neuropathy.   2. Essential hypertension- taking BP med as prescribed with no issues.   3. Impaired glucose tolerance- tries to watch what he eats but is coming off a two month vacation.   4. Anxiety  - takes Xanax rarely.    History/Other:   Fall Risk Assessment:   He has been screened for Falls and is low risk.      Cognitive Assessment:   He had a completely normal cognitive assessment - see flowsheet entries     Functional Ability/Status:   Yohan Cook has a completely normal functional assessment. See flowsheet for details.        Depression Screening (PHQ-2/PHQ-9): PHQ-2 SCORE: 0  , done 2/23/2024             Advanced Directives:   He does NOT have a Living Will. [Do you have a living will?: No]  He does NOT have a Power of  for Health Care. [Do you have a healthcare power of ?: No]  Patient has Advance Care Planning documents but we do not have a copy in EMR. Discussed Advanced Care Planning with patient and instructed patient to get our office a copy to be scanned into EMR.      Patient Active Problem List   Diagnosis    Essential hypertension    Impaired glucose tolerance    History of adenomatous polyp of colon    Neuropathy     Allergies:  He has No Known Allergies.    Current Medications:  Outpatient Medications Marked as Taking for the 2/23/24 encounter (Office Visit) with Ellie Iglesias, DO   Medication Sig    ALPRAZolam (XANAX) 0.5 MG Oral Tab Take 1 tablet (0.5 mg total) by mouth nightly as needed.    gabapentin 300 MG Oral Cap Take 1 capsule (300 mg total) by mouth 3 (three) times daily.    lisinopril 40 MG Oral Tab Take 1 tablet (40 mg  total) by mouth once daily.    dilTIAZem  MG Oral Capsule SR 24 Hr Take 1 capsule (300 mg total) by mouth once daily.    Multiple Vitamins-Minerals (MULTI-VITAMIN/MINERALS) Oral Tab Take 1 tablet by mouth daily.       Medical History:  He  has a past medical history of Allergic rhinitis, Arthritis, Back pain, Essential hypertension, Gout, Hypertension, Multiple allergies, S/P arthroscopy of right shoulder/DCE, SAD & labral debridment sx 2-27-20/Global 5-25-20/TRK/EP (02/27/2020), Tendinitis of right rotator cuff (09/17/2019), and Wears glasses.  Surgical History:  He  has a past surgical history that includes appendectomy; back surgery; colonoscopy; shoulder arthroscopy (Right, 02/27/2020); and cataract.   Family History:  His family history includes Cancer in his father and sister; Depression in an other family member; Heart Attack in his mother; Heart Disease in his father, mother, and sister; Heart Disorder in his mother; Hypertension in his father and mother; Kidney stones in an other family member; Lung Disorder in an other family member; Non-hodgkins lymphoma in his father and another family member; Prostate Cancer in his father; Pulmonary Disease in his sister.  Social History:  He  reports that he has never smoked. He has never used smokeless tobacco. He reports current alcohol use of about 8.0 standard drinks of alcohol per week. He reports that he does not use drugs.    Tobacco:  He has never smoked tobacco.    CAGE Alcohol Screen:   CAGE screening score of 0 on 2/17/2024, showing low risk of alcohol abuse.      Patient Care Team:  Ellie Iglesias DO as PCP - General    Review of Systems       Objective:   Physical Exam  General Appearance:  Alert, cooperative, no distress, appears stated age   Head:  Normocephalic, without obvious abnormality, atraumatic   Eyes:  PERRL, conjunctiva/corneas clear, EOM's intact, both eyes   Ears:  Normal TM's and external ear canals, both ears   Nose: Nares normal,  septum midline, mucosa normal, no drainage or sinus tenderness   Throat: Lips, mucosa, and tongue normal; teeth and gums normal   Neck: Supple, symmetrical, trachea midline, no adenopathy, thyroid: not enlarged, symmetric, no tenderness/mass/nodules, no carotid bruit or JVD   Back:   Symmetric, no curvature, ROM normal, no CVA tenderness   Lungs:   Clear to auscultation bilaterally, respirations unlabored   Chest Wall:  No tenderness or deformity   Heart:  Regular rate and rhythm, S1, S2 normal, no murmur, rub or gallop               Extremities: Extremities normal, atraumatic, no cyanosis or edema   Pulses: 2+ and symmetric   Skin: Skin color, texture, turgor normal, no rashes or lesions   Lymph nodes: Cervical, supraclavicular, and axillary nodes normal   Neurologic: Normal     /80   Pulse 85   Ht 5' 11\" (1.803 m)   Wt 229 lb (103.9 kg)   SpO2 95%   BMI 31.94 kg/m²  Estimated body mass index is 31.94 kg/m² as calculated from the following:    Height as of this encounter: 5' 11\" (1.803 m).    Weight as of this encounter: 229 lb (103.9 kg).    Medicare Hearing Assessment:   Hearing Screening    Screening Method: Whisper Test  Whisper Test Result: Pass                   Assessment & Plan:   Yohan Cook is a 73 year old male who presents for a Medicare Assessment.     1. Encounter for annual health examination (Primary)  2. Essential hypertension  3. Impaired glucose tolerance  4. Anxiety  -     ALPRAZolam; Take 1 tablet (0.5 mg total) by mouth nightly as needed.  Dispense: 20 tablet; Refill: 0  The patient indicates understanding of these issues and agrees to the plan.  Reinforced healthy diet, lifestyle, and exercise.  1. Encounter for annual health examination (Primary)- Reviewed age-appropriate preventive health and safety recommendations with patient. Reviewed lab results. Encouraged regular exercise and healthy eating.   2. Essential hypertension- BP controlled on med. Continue.   3. Impaired  glucose tolerance- continue to watch carbs and sugars.   4. Anxiety  -     ALPRAZolam; Take 1 tablet (0.5 mg total) by mouth nightly as needed.  Dispense: 20 tablet; Refill: 0    Return in 6 months (on 8/23/2024).     Ellie Iglesias DO, 2/23/2024     Supplementary Documentation:   General Health:  In the past six months, have you lost more than 10 pounds without trying?: 2 - No  Has your appetite been poor?: No  Type of Diet: Balanced  How does the patient maintain a good energy level?: Stretching  How would you describe your daily physical activity?: Light  How would you describe your current health state?: Fair  How do you maintain positive mental well-being?: Social Interaction  On a scale of 0 to 10, with 0 being no pain and 10 being severe pain, what is your pain level?: 2 - (Mild)  In the past six months, have you experienced urine leakage?: 0-No  At any time do you feel concerned for the safety/well-being of yourself and/or your children, in your home or elsewhere?: No  Have you had any immunizations at another office such as Influenza, Hepatitis B, Tetanus, or Pneumococcal?: No          Yohan Cook's SCREENING SCHEDULE   Tests on this list are recommended by your physician but may not be covered, or covered at this frequency, by your insurer.   Please check with your insurance carrier before scheduling to verify coverage.   PREVENTATIVE SERVICES FREQUENCY &  COVERAGE DETAILS LAST COMPLETION DATE   Diabetes Screening    Fasting Blood Sugar / Glucose    One screening every 12 months if never tested or if previously tested but not diagnosed with pre-diabetes   One screening every 6 months if diagnosed with pre-diabetes Lab Results   Component Value Date     (H) 02/19/2024        Cardiovascular Disease Screening    Lipid Panel  Cholesterol  Lipoprotein (HDL)  Triglycerides Covered every 5 years for all Medicare beneficiaries without apparent signs or symptoms of cardiovascular disease Lab  Results   Component Value Date    CHOLEST 217 (H) 02/19/2024    HDL 75 (H) 02/19/2024     (H) 02/19/2024    TRIG 128 02/19/2024         Electrocardiogram (EKG)   Covered if needed at Welcome to Medicare, and non-screening if indicated for medical reasons 02/06/2020      Ultrasound Screening for Abdominal Aortic Aneurysm (AAA) Covered once in a lifetime for one of the following risk factors    Men who are 65-75 years old and have ever smoked    Anyone with a family history -     Colorectal Cancer Screening  Covered for ages 50-85; only need ONE of the following:    Colonoscopy   Covered every 10 years    Covered every 2 years if patient is at high risk or previous colonoscopy was abnormal 05/18/2023    Health Maintenance   Topic Date Due    Colorectal Cancer Screening  05/18/2026       Flexible Sigmoidoscopy   Covered every 4 years -    Fecal Occult Blood Test Covered annually -   Prostate Cancer Screening    Prostate-Specific Antigen (PSA) Annually No results found for: \"PSA\"  Health Maintenance   Topic Date Due    PSA  02/19/2026      Immunizations    Influenza Covered once per flu season  Please get every year 09/13/2023  No recommendations at this time    Pneumococcal Each vaccine (Cievxdg44 & [id number]) covered once after 65 Prevnar 13: 10/09/2017    Pojznvfqq86: 10/18/2018     No recommendations at this time    Hepatitis B One screening covered for patients with certain risk factors   -  No recommendations at this time    Tetanus Toxoid Not covered by Medicare Part B unless medically necessary (cut with metal); may be covered with your pharmacy prescription benefits 12/13/2021    Tetanus, Diptheria and Pertusis TD and TDaP Not covered by Medicare Part B -  No recommendations at this time    Zoster Not covered by Medicare Part B; may be covered with your pharmacy  prescription benefits 08/20/2012  No recommendations at this time     Annual Monitoring of Persistent Medications (ACE/ARB, digoxin  diuretics, anticonvulsants)    Potassium Annually Lab Results   Component Value Date    K 4.5 02/19/2024         Creatinine   Annually Lab Results   Component Value Date    CREATSERUM 0.96 02/19/2024         BUN Annually Lab Results   Component Value Date    BUN 10 02/19/2024       Drug Serum Conc Annually No results found for: \"DIGOXIN\", \"DIG\", \"VALP\"

## 2024-02-27 ENCOUNTER — TELEPHONE (OUTPATIENT)
Dept: INTERNAL MEDICINE CLINIC | Facility: CLINIC | Age: 74
End: 2024-02-27

## 2024-02-27 RX ORDER — MELOXICAM 15 MG/1
15 TABLET ORAL DAILY PRN
Qty: 30 TABLET | Refills: 0 | Status: SHIPPED | OUTPATIENT
Start: 2024-02-27

## 2024-02-27 NOTE — TELEPHONE ENCOUNTER
Called pt because e-prescribing is down and we have a physical copy for him to  and take to pharmacy, placed with  staff.

## 2024-02-27 NOTE — TELEPHONE ENCOUNTER
Requested Prescriptions     Pending Prescriptions Disp Refills    Meloxicam 15 MG Oral Tab 30 tablet 0     Sig: Take 1 tablet (15 mg total) by mouth daily as needed.     PT REQUESTED REFILL OVER THE PHONE    LOV:2/23/24 AMS    LAST CPE:2/23/24 AMS    Last Labs:2/19/24 lipid,cbc,cmp,A1c,psa    Last Refill:  Medication Quantity Refills Start End   Meloxicam 15 MG Oral Tab (Discontinued) 30 tablet 1 12/9/2022 6/16/2023   Sig:   Take 1 tablet (15 mg total) by mouth daily.     Patient taking differently:   Take 1 tablet (15 mg total) by mouth daily as needed.     Route:   Oral     Order #:   [id number]

## 2024-02-28 LAB
VIT E ALPHA TOCO: 15 MG/L
VIT E GAMMA TOCO: 1.5 MG/L
VITAMIN B6: 29.8 UG/L

## 2024-02-29 ENCOUNTER — TELEPHONE (OUTPATIENT)
Dept: INTERNAL MEDICINE CLINIC | Facility: CLINIC | Age: 74
End: 2024-02-29

## 2024-02-29 NOTE — TELEPHONE ENCOUNTER
Pt requesting appt today for \"    I have swelling, redness and considerable pain in left elbow.  Not caused by injury.     AMS not in today   Xelscottz Pregnancy And Lactation Text: This medication is Pregnancy Category D and is not considered safe during pregnancy.  The risk during breast feeding is also uncertain.

## 2024-02-29 NOTE — TELEPHONE ENCOUNTER
Called and spoke w/ pt. Notified AMS can see pt tomorrow at 11AM. Pt stated he is able to come in then. Schedule OV for tomorrow. Pt agreeable to plan.

## 2024-02-29 NOTE — TELEPHONE ENCOUNTER
Left elbow swelling, redness, pain since Sunday. Denies injury or break in the skin. Denies drainage or pus. Patient states he had same symptoms before and was told it was gout. States pain 8/10. Recommended [facility] give symptoms or evaluation. Patient declined. He is asking for AMS to squeeze him in like she did last time. Next available appointment Monday.   Patient states if it worsens he will go to [facility] but as of now he is persistent about AMS seeing him. Advised I would send her a message for review.

## 2024-03-01 ENCOUNTER — OFFICE VISIT (OUTPATIENT)
Dept: INTERNAL MEDICINE CLINIC | Facility: CLINIC | Age: 74
End: 2024-03-01
Payer: MEDICARE

## 2024-03-01 VITALS
HEART RATE: 81 BPM | SYSTOLIC BLOOD PRESSURE: 124 MMHG | HEIGHT: 71 IN | DIASTOLIC BLOOD PRESSURE: 68 MMHG | OXYGEN SATURATION: 97 % | WEIGHT: 231 LBS | BODY MASS INDEX: 32.34 KG/M2

## 2024-03-01 DIAGNOSIS — Z87.39 HISTORY OF GOUT: ICD-10-CM

## 2024-03-01 DIAGNOSIS — L03.114 CELLULITIS OF LEFT ARM: Primary | ICD-10-CM

## 2024-03-01 PROCEDURE — 99214 OFFICE O/P EST MOD 30 MIN: CPT | Performed by: FAMILY MEDICINE

## 2024-03-01 RX ORDER — SULFAMETHOXAZOLE AND TRIMETHOPRIM 800; 160 MG/1; MG/1
1 TABLET ORAL 2 TIMES DAILY
Qty: 14 TABLET | Refills: 0 | Status: SHIPPED | OUTPATIENT
Start: 2024-03-01 | End: 2024-03-01

## 2024-03-01 RX ORDER — METHYLPREDNISOLONE 4 MG/1
TABLET ORAL
Qty: 1 EACH | Refills: 0 | Status: SHIPPED | OUTPATIENT
Start: 2024-03-01 | End: 2024-03-01

## 2024-03-01 RX ORDER — SULFAMETHOXAZOLE AND TRIMETHOPRIM 800; 160 MG/1; MG/1
1 TABLET ORAL 2 TIMES DAILY
Qty: 14 TABLET | Refills: 0 | Status: SHIPPED | OUTPATIENT
Start: 2024-03-01 | End: 2024-03-08

## 2024-03-01 RX ORDER — METHYLPREDNISOLONE 4 MG/1
TABLET ORAL
Qty: 1 EACH | Refills: 0 | Status: SHIPPED | OUTPATIENT
Start: 2024-03-01

## 2024-03-03 NOTE — PROGRESS NOTES
Subjective:   Patient ID: Yohan Cook is a 73 year old male.    HPI Here with c/o left elbow swelling and redness x 5 days. Patient has had gout in the past and this feels similar. No fever. No decreased ROM. Has been icing and redness is slightly better.     History/Other:   Past Medical History:   Diagnosis Date    Allergic rhinitis     Arthritis     Back pain     Essential hypertension     Gout     Hypertension     Multiple allergies     S/P arthroscopy of right shoulder/DCE, SAD & labral debridment sx 2-27-20/Global 5-25-20/TRK/EP 02/27/2020    Tendinitis of right rotator cuff 09/17/2019    Wears glasses        Review of Systems   Constitutional:  Negative for chills and fever.   Skin:  Positive for color change. Negative for wound.   Neurological:  Negative for weakness and numbness.     Current Outpatient Medications   Medication Sig Dispense Refill    methylPREDNISolone (MEDROL) 4 MG Oral Tablet Therapy Pack As directed. 1 each 0    sulfamethoxazole-trimethoprim -160 MG Oral Tab per tablet Take 1 tablet by mouth 2 (two) times daily for 7 days. 14 tablet 0    Meloxicam 15 MG Oral Tab Take 1 tablet (15 mg total) by mouth daily as needed. 30 tablet 0    ALPRAZolam (XANAX) 0.5 MG Oral Tab Take 1 tablet (0.5 mg total) by mouth nightly as needed. 20 tablet 0    gabapentin 300 MG Oral Cap Take 1 capsule (300 mg total) by mouth 3 (three) times daily. 90 capsule 0    lisinopril 40 MG Oral Tab Take 1 tablet (40 mg total) by mouth once daily. 90 tablet 0    dilTIAZem  MG Oral Capsule SR 24 Hr Take 1 capsule (300 mg total) by mouth once daily. 90 capsule 1    Multiple Vitamins-Minerals (MULTI-VITAMIN/MINERALS) Oral Tab Take 1 tablet by mouth daily.       Allergies:No Known Allergies    Objective:   Physical Exam  Vitals reviewed.   Constitutional:       Appearance: Normal appearance. He is well-developed.   HENT:      Head: Normocephalic and atraumatic.   Pulmonary:      Effort: Pulmonary effort is  normal.   Musculoskeletal:      Left elbow: No lacerations. Normal range of motion. No tenderness.   Skin:            Comments: Approx 4x4cm area of mild erythema, soft tissue swelling   Neurological:      Mental Status: He is alert.   Psychiatric:         Mood and Affect: Mood normal.         Behavior: Behavior normal.         Assessment & Plan:   1. Cellulitis of left arm    2. History of gout    1,2. Will treat for gout vs cellulitis. Start abx and steroid, has tolerated steroid in the past. Discussed risks/benefits/potential side effects and proper use of medication. Lucho pen samuel around erythema and patient instructed to go to ER if worsening redness, fever, decreased ROM or pain with ROM.       No orders of the defined types were placed in this encounter.      Meds This Visit:  Requested Prescriptions     Signed Prescriptions Disp Refills    methylPREDNISolone (MEDROL) 4 MG Oral Tablet Therapy Pack 1 each 0     Sig: As directed.    sulfamethoxazole-trimethoprim -160 MG Oral Tab per tablet 14 tablet 0     Sig: Take 1 tablet by mouth 2 (two) times daily for 7 days.       Imaging & Referrals:  None

## 2024-03-04 ENCOUNTER — PATIENT MESSAGE (OUTPATIENT)
Dept: INTERNAL MEDICINE CLINIC | Facility: CLINIC | Age: 74
End: 2024-03-04

## 2024-03-04 NOTE — TELEPHONE ENCOUNTER
From: Yohan Cook  To: Ellie Iglesias  Sent: 3/4/2024 9:23 AM CST  Subject: Elbow Update-Gout    Hello Dr. Iglesias,    The medication prescribed is working. The inflammation and redness have both subsided. I still have some pain in the elbow but I would say it’s 80% improved. I will continue to ice and take the remaining medication as prescribed.    Thank you again for seeing me last Friday, it definitely gave me relief this weekend.    Oscar

## 2024-03-14 ENCOUNTER — OFFICE VISIT (OUTPATIENT)
Dept: FAMILY MEDICINE CLINIC | Facility: CLINIC | Age: 74
End: 2024-03-14
Payer: MEDICARE

## 2024-03-14 VITALS
SYSTOLIC BLOOD PRESSURE: 140 MMHG | DIASTOLIC BLOOD PRESSURE: 80 MMHG | RESPIRATION RATE: 16 BRPM | HEART RATE: 69 BPM | HEIGHT: 71 IN | TEMPERATURE: 98 F | WEIGHT: 228 LBS | BODY MASS INDEX: 31.92 KG/M2 | OXYGEN SATURATION: 98 %

## 2024-03-14 DIAGNOSIS — R03.0 ELEVATED BLOOD PRESSURE READING: ICD-10-CM

## 2024-03-14 DIAGNOSIS — Z20.822 EXPOSURE TO COVID-19 VIRUS: Primary | ICD-10-CM

## 2024-03-14 DIAGNOSIS — U07.1 COVID-19: ICD-10-CM

## 2024-03-14 LAB
OPERATOR ID: ABNORMAL
POCT LOT NUMBER: ABNORMAL
RAPID SARS-COV-2 BY PCR: DETECTED

## 2024-03-14 PROCEDURE — 99213 OFFICE O/P EST LOW 20 MIN: CPT | Performed by: NURSE PRACTITIONER

## 2024-03-14 PROCEDURE — U0002 COVID-19 LAB TEST NON-CDC: HCPCS | Performed by: NURSE PRACTITIONER

## 2024-03-14 NOTE — PATIENT INSTRUCTIONS
If you start Paxlovid-caution with Diltiazem, may cause elevated levels of Diltiazem and lead to hypotension or low heart rate arrythmia. Consider holding Diltiazem with use if able or take lower dose during treatment.

## 2024-03-14 NOTE — PROGRESS NOTES
Yohan Cook is a 73 year old male who presents with ill symptoms for  several  days. Patient reports has had mild congestion last week, symptoms improved, now with stuffy nose worsening yesterday. Wife has similar symptoms with strong indication of positive Covid test this morning. Patient at  home Covid test with very faint line. Has tried nothing for symptom relief. Unsure if Covid currently starting or if possibly finishing Covid illness, has appointment scheduled today with specialist and unsure if needs to be rescheduled.     Current Outpatient Medications   Medication Sig Dispense Refill    Meloxicam 15 MG Oral Tab Take 1 tablet (15 mg total) by mouth daily as needed. 30 tablet 0    ALPRAZolam (XANAX) 0.5 MG Oral Tab Take 1 tablet (0.5 mg total) by mouth nightly as needed. 20 tablet 0    gabapentin 300 MG Oral Cap Take 1 capsule (300 mg total) by mouth 3 (three) times daily. 90 capsule 0    lisinopril 40 MG Oral Tab Take 1 tablet (40 mg total) by mouth once daily. 90 tablet 0    dilTIAZem  MG Oral Capsule SR 24 Hr Take 1 capsule (300 mg total) by mouth once daily. 90 capsule 1    Multiple Vitamins-Minerals (MULTI-VITAMIN/MINERALS) Oral Tab Take 1 tablet by mouth daily.       No current facility-administered medications for this visit.      Past Medical History:   Diagnosis Date    Allergic rhinitis     Arthritis     Back pain     Essential hypertension     Gout     Hypertension     Multiple allergies     S/P arthroscopy of right shoulder/DCE, SAD & labral debridment sx 2-27-20/Global 5-25-20/TRK/EP 02/27/2020    Tendinitis of right rotator cuff 09/17/2019    Wears glasses       Past Surgical History:   Procedure Laterality Date    APPENDECTOMY      BACK SURGERY      CATARACT      COLONOSCOPY      SHOULDER ARTHROSCOPY Right 02/27/2020      Family History   Problem Relation Age of Onset    Prostate Cancer Father     Hypertension Father     Heart Disease Father     Other (Non-hodgkins lymphoma)  Father     Cancer Father     Hypertension Mother     Heart Attack Mother     Heart Disease Mother     Heart Disorder Mother         Heart attack    Other (Non-hodgkins lymphoma) Other         Sibllings    Lung Disorder Other         COPD  Siblings    Other (Kidney stones) Other         Siblings    Depression Other         Siblings    Heart Disease Sister     Cancer Sister     Pulmonary Disease Sister         COPD      Social History     Socioeconomic History    Marital status:    Tobacco Use    Smoking status: Never    Smokeless tobacco: Never   Vaping Use    Vaping Use: Never used   Substance and Sexual Activity    Alcohol use: Yes     Alcohol/week: 8.0 standard drinks of alcohol     Types: 4 Glasses of wine, 4 Cans of beer per week    Drug use: No   Other Topics Concern    Caffeine Concern No    Exercise No    Seat Belt No    Special Diet No    Stress Concern No    Weight Concern No         REVIEW OF SYSTEMS:   GENERAL: feels well otherwise  HEENT:  as above in HPI  LUNGS:denies shortness of breath with exertion  CARDIOVASCULAR: denies chest pain on exertion  GI: no nausea or abdominal pain, appetite normal  NEURO: denies headaches    EXAM:   /80   Pulse 69   Temp 97.5 °F (36.4 °C)   Resp 16   Ht 5' 11\" (1.803 m)   Wt 228 lb (103.4 kg)   SpO2 98%   BMI 31.80 kg/m²   GENERAL: well developed, well nourished,in no apparent distress  HEENT: atraumatic, normocephalic,ears normal appearing, throat pink. Uvuvla midline.    NECK: supple,no adenopathy  LUNGS: clear to auscultation all lobes, breathing is non labored  CARDIO: RRR without murmur  Results for orders placed or performed in visit on 03/14/24   COVID-19 LAB TEST NON-CDC    Collection Time: 03/14/24 11:18 AM    Specimen: Nares   Result Value Ref Range    Rapid SARS-CoV-2 by PCR Detected (A) Not Detected    POCT Lot Number [id number]     POCT Expiration Date 8-28-25     POCT Procedure Control Control Valid Control Valid     ID [id number]           ASSESSMENT AND PLAN:    PLAN:Yohan was seen today for covid.    Diagnoses and all orders for this visit:    Exposure to COVID-19 virus  -     COVID-19 LAB TEST NON-CDC    Elevated blood pressure reading    COVID-19  -     nirmatrelvir-ritonavir 300-100 MG Oral Tablet Therapy Pack; Take two nirmatrelvir tablets (300mg) with one ritonavir tablet (100mg) together twice daily for 5 days.      Covid testing positive for PCR in office. Patient will procure antigen testing for home and retest in 48 hours. Paxlovid discussed and prescribed after risk/benefit discussed and medication interactions discussed.  CDC guidance discussed. Follow up with appointment for specialist to reschedule. Follow up closely with PCP if not improving.. Patient verbalized understanding and agrees to plan.     Patient Instructions   If you start Paxlovid-caution with Diltiazem, may cause elevated levels of Diltiazem and lead to hypotension or low heart rate arrythmia. Consider holding Diltiazem with use if able or take lower dose during treatment.

## 2024-03-20 ENCOUNTER — OFFICE VISIT (OUTPATIENT)
Dept: PHYSICAL MEDICINE AND REHAB | Facility: CLINIC | Age: 74
End: 2024-03-20
Payer: MEDICARE

## 2024-03-20 ENCOUNTER — TELEPHONE (OUTPATIENT)
Dept: PHYSICAL MEDICINE AND REHAB | Facility: CLINIC | Age: 74
End: 2024-03-20

## 2024-03-20 VITALS — WEIGHT: 228 LBS | HEIGHT: 71 IN | BODY MASS INDEX: 31.92 KG/M2

## 2024-03-20 DIAGNOSIS — M54.17 LUMBOSACRAL RADICULOPATHY AT L5: Primary | ICD-10-CM

## 2024-03-20 PROCEDURE — 99214 OFFICE O/P EST MOD 30 MIN: CPT | Performed by: PHYSICAL MEDICINE & REHABILITATION

## 2024-03-20 RX ORDER — GABAPENTIN 600 MG/1
600 TABLET ORAL 3 TIMES DAILY
Qty: 90 TABLET | Refills: 1 | Status: SHIPPED | OUTPATIENT
Start: 2024-03-20

## 2024-03-20 NOTE — TELEPHONE ENCOUNTER
Per CMS Guidelines -no authorization is required for Bilateral L5 TFESI with fluoroscopic guidance CPT 22004-09     Status: Authorization is not required based on medical necessity however may be subject to review once claim is submitted-Covered Benefit

## 2024-03-20 NOTE — PROGRESS NOTES
RETURN PATIENT VISIT    CHIEF COMPLAINT  Bilateral foot numbness and tingling    INTERVAL HISTORY  Yohan Cook is a 73 year old who was last seen in clinic on 2/14/2024 following up on bilateral foot numbness and tingling, no significant pain, no significant weakness.  Was recently seen by neurology where CIDP was deemed less likely.  Inconsistent with GBS or CIDP based on his timeline as well as the EMG report which was reviewed by Dr. Velazquez.  There was concern for polyneuropathy in the legs as well as prior lumbar radiculopathy status post 2 previous surgeries.    Patient finished gabapentin, he believes there was some improvement however he was unclear that he was to continue this medication or return for titration of his dose.    Patient continues to endorse the numbness and is bothered by it daily, denies significant weakness or significant pain.  He is interested in exhausting all conservative treatment options.    REVIEW OF SYSTEMS  Review of systems was completed with the patient today as pertinent to today's visit    PHYSICAL EXAMINATION  CONSTITUTIONAL: Well-appearing, in no apparent distress  EYES: No scleral icterus or conjunctival hemorrhage  CARDIOVASCULAR: Skin warm and well-perfused, no peripheral edema  RESPIRATORY: Breathing unlabored without accessory muscle use  PSYCHIATRIC: Alert, cooperative, appropriate mood and affect  SKIN: No lesions or rashes on exposed skin  MUSCULOSKELETAL: Good range of motion of the lumbar spine.  Neural tension signs are negative.  NEUROLOGIC: Intact brisk deep tendon reflexes, well-maintained strength in the lower extremities.  Sensation diminished in the bilateral feet which has been consistent.    REVIEW OF PRIOR X-RAYS/STUDIES  MRI images independently reviewed, these are available in PACS.     There is a diffuse disc bulge paracentral to the right with suspected mass effect on the S1 nerve root.  There is moderate central canal and bilateral  neuroforaminal narrowing at L3-4 and L4-5.  Appearance of epidural lipomatosis as well.    IMPRESSION/DIAGNOSIS  1.    Encounter Diagnosis   Name Primary?    Lumbosacral radiculopathy at L5 Yes       TREATMENT/PLAN  Double gabapentin.to 600mg three times a day.   Otherwise will trial bilateral L5 TFESI with fluoroscopic guidance, local anesthesia. Patient states that his previous injections were complicated by scar tissue.     Education was provided regarding the above impression/diagnosis and treatment options/plan were discussed.  All questions were answered during today's visit.  Patient will contact clinic if any other questions or concerns.          Junito Jones,   Interventional Spine and Sports Medicine Specialist   Physical Medicine and Rehabilitation  [facility] [place] [id number] [place] [id number]    [facility]  [address] [place] [address]

## 2024-03-21 NOTE — TELEPHONE ENCOUNTER
Patient has been scheduled for  Bilateral L5 TFESI with fluoroscopic guidance  on 3/26/2024 at the [facility] with .   -Anesthesia type: local.  -If scheduling [facility] covid testing required for all procedures whether patient is vaccinated or not.  -Patient informed not to eat or drink anything after midnight the night prior to the procedure, if being sedated. (For afternoon injections: Patient to fast 6-8 hours prior to procedure with IVCS/MAC. )  -Patient was advised that if he/she does receive the covid vaccine it needs to be at least 2 weeks before or after the injection.  -Medications and allergies reviewed.  -Patient reminded to hold NSAIDs (Ibuprofen, ASA 81, Aleve, Naproxen, Mobic, Diclofenac, Etodolac, Celebrex etc.) for 3 days prior to LUMBAR FACET JOINT INJECTIONS OR MEDIAL BRANCH BLOCK INJECTIONS  if BMI is greater than 35. For Cervical injections only hold multivitamins, Vitamin E, Fish Oil, Phentermine (Lomaira) for 7 days prior to injection and NSAIDS.   mg to be held for 7 days prior to injections.  -If patient is receiving MAC/IVCS Phentermine (Lomaira), Adlyxin (Lixisenatide), Bydureon BCise (Exenatide-release), Byetta (Exenatide), Mounjaro (Tirezepatide), Ozempic (Semaglutide), Rybelsus (oral demaglutide), Saxenda (Liraglutide), Trulicity (Dulaglutide), Victoriza (Liraglutide), Wegovy (Semaglutide), Berberine (oral natures ozempic) will need to be held for 7 days prior to injection.  -If patient's BMI is greater than 50. Patient is unable to get IVCS/MAC at [facility]. (Options: Patient can go to [facility] under MAC or ENDO under IVCS-reminder physician's slots at ENDO are limited. OR Patient can have the procedure done under local anesthesia at [facility] with Valium ( per physician's preference.)    -If on blood thinner clearance has been received to hold this medication by provider.   -Patient informed he/she will need a  to and from procedure. EFFECTIVE 12/1/23- Per [facility]: \" Our PAT team will notify  the patient that their ride MUST remain onsite for the entirety of their visit if the ride is unable to do so the procedure will be canceled. \"    -[facility] is located in the [facility] 1st floor. Patient may park in the yellow or purple parking.  Patient verbalized understanding and agrees with plan.  -----> Scheduled in Epic: Yes  -----> Scheduled in [employer]/Surgical Case Request: Yes

## 2024-04-10 ENCOUNTER — OFFICE VISIT (OUTPATIENT)
Dept: PHYSICAL MEDICINE AND REHAB | Facility: CLINIC | Age: 74
End: 2024-04-10
Payer: MEDICARE

## 2024-04-10 ENCOUNTER — PATIENT MESSAGE (OUTPATIENT)
Dept: PHYSICAL MEDICINE AND REHAB | Facility: CLINIC | Age: 74
End: 2024-04-10

## 2024-04-10 VITALS — HEIGHT: 71 IN | WEIGHT: 228 LBS | BODY MASS INDEX: 31.92 KG/M2 | RESPIRATION RATE: 18 BRPM

## 2024-04-10 DIAGNOSIS — R20.2 RIGHT LEG PARESTHESIAS: ICD-10-CM

## 2024-04-10 DIAGNOSIS — R20.2 LEFT LEG PARESTHESIAS: ICD-10-CM

## 2024-04-10 DIAGNOSIS — R20.0 NUMBNESS OF FOOT: Primary | ICD-10-CM

## 2024-04-10 PROCEDURE — 99214 OFFICE O/P EST MOD 30 MIN: CPT | Performed by: PHYSICAL MEDICINE & REHABILITATION

## 2024-04-10 RX ORDER — DULOXETIN HYDROCHLORIDE 30 MG/1
30 CAPSULE, DELAYED RELEASE ORAL DAILY
Qty: 30 CAPSULE | Refills: 0 | Status: SHIPPED | OUTPATIENT
Start: 2024-04-10

## 2024-04-10 NOTE — TELEPHONE ENCOUNTER
From: Yohan Cook  To: Junito Jones  Sent: 4/10/2024 4:16 PM CDT  Subject: Todays Visit 4/10/24    It looks like today’s visit was deleted on [employer].     ThanksOscar

## 2024-04-10 NOTE — PROGRESS NOTES
RETURN PATIENT VISIT    CHIEF COMPLAINT  Bilateral leg and feet paresthesias    INTERVAL HISTORY  Yohan Cook is a 73 year old who was last seen in clinic on 3/26/2024 where he underwent bilateral L5 transforaminal epidural steroid injection.  Overall he endorses about 20 to 25% relief.  Continues to endorse numbness in the bilateral feet, no significant pain which is similar to previous visits.    He does note some improved feeling in the toes but now has some stiffness in the toes which is a newer complaint for him.  Still numbness in the tops of the feet.    He tolerates gabapentin and does note some improvement with this medication.  He is interested in potentially trying addition of medication prior to consideration of returning to surgery to discuss decompressive intervention.    REVIEW OF SYSTEMS  Review of systems was completed with the patient today as pertinent to today's visit    PHYSICAL EXAMINATION  CONSTITUTIONAL: Well-appearing, in no apparent distress  EYES: No scleral icterus or conjunctival hemorrhage  CARDIOVASCULAR: Skin warm and well-perfused, no peripheral edema  RESPIRATORY: Breathing unlabored without accessory muscle use  PSYCHIATRIC: Alert, cooperative, appropriate mood and affect  SKIN: No lesions or rashes on exposed skin  MUSCULOSKELETAL:  Good range of motion of the lumbar spine.  Neural tension signs are negative.  NEUROLOGIC: Intact brisk deep tendon reflexes, well-maintained strength in the lower extremities.  Sensation diminished in the bilateral feet which has been consistent.    REVIEW OF PRIOR X-RAYS/STUDIES  MRI images independently reviewed, these are available in PACS.     There is a diffuse disc bulge paracentral to the right with suspected mass effect on the S1 nerve root.  There is moderate central canal and bilateral neuroforaminal narrowing at L3-4 and L4-5.  Appearance of epidural lipomatosis as well.    IMPRESSION/DIAGNOSIS  1.    Encounter Diagnoses   Name  Primary?    Numbness of foot Yes    Left leg paresthesias     Right leg paresthesias          TREATMENT/PLAN  Trial addition of duloxetine to gabapentin for a period of a month, then consider reevaluation with Dr. Esparza to consider decompression.     Partially successful Diagnositic injeciton, EMG and MRI evidence of multilevel compression.     Education was provided regarding the above impression/diagnosis and treatment options/plan were discussed.  All questions were answered during today's visit.  Patient will contact clinic if any other questions or concerns.          Junito oJnes,   Interventional Spine and Sports Medicine Specialist   Physical Medicine and Rehabilitation  [address] [address]    [facility] [address] [place] [address]

## 2024-04-11 NOTE — TELEPHONE ENCOUNTER
From: Yohan Cook  To: Junito Jones  Sent: 4/10/2024 8:13 PM CDT  Subject: Follow Up Decision     Hello Dr. Jones,    After our lengthy discussion today, I think the medication is basically going to mask my disc compression problem. I think decompression of the never is my best option at this point and I will consult with Dr. Esparza to see if surgery would be my next step.     Let me know your thoughts.    Thanks,  Oscar

## 2024-04-12 ENCOUNTER — OFFICE VISIT (OUTPATIENT)
Dept: ORTHOPEDICS CLINIC | Facility: CLINIC | Age: 74
End: 2024-04-12
Payer: MEDICARE

## 2024-04-12 DIAGNOSIS — M54.17 LUMBOSACRAL RADICULOPATHY AT L5: Primary | ICD-10-CM

## 2024-04-12 PROCEDURE — 99215 OFFICE O/P EST HI 40 MIN: CPT | Performed by: STUDENT IN AN ORGANIZED HEALTH CARE EDUCATION/TRAINING PROGRAM

## 2024-04-12 NOTE — PROGRESS NOTES
[facility] - ORTHOPEDICS  [address] [place] [address], [address] [address]  [address] [place] [address]  [phone]     FOLLOW-UP PATIENT VISIT    Name: Yohan Cook   MRN: [id number]  Date: 4/12/2024     CC: lumbar radiculopathy      INTERVAL HISTORY:   Yohan Cook is a 73 year old male  follow-up patient whom I have been treating conservatively. Patient returns today for reevaluation of lumbar radiculopathy.     Patient has history of L5-S1 decompression dating back to the early 1990s. Patient symptoms started in July 2023 low back pain. Patient was referred to physical therapy and had manipulation started developing bilateral foot numbness right worse than left. Patient recently had an EMG demonstrating L5 radiculopathy. Patient has had multiple epidural steroid injections slight improvement. Patient also has tried Gabapentin and Duloxetine.     Bowel and bladder symptoms: absent.    The patient has not had issues with balance and/or hand dexterity problems such as changes in penmanship or the use of buttons or zippers.    We have tried the following interventions thus far:    ROS: No fever/chills or other constitutional issues.    PE:   There were no vitals filed for this visit.  Estimated body mass index is 31.8 kg/m² as calculated from the following:    Height as of 4/10/24: 5' 11\" (1.803 m).    Weight as of 4/10/24: 228 lb (103.4 kg).    On physical examination, he is awake, alert and oriented x 3 and in no acute distress. Mood, affect and language are normal. He appears well developed and well nourished.  He walks without a nonantalgic, nonmyelopathic, non-Trendelenburg gait. Motor strength testing of the lower extremities shows 5/5 strength in hip flexors, knee extensors, ankle dorsiflexors, toe extensor, and gastroc-soleus complex.   Sensation is intact to light touch L2-S1 distributions bilaterally. Reflexes normal.     Radiographic  Examination/Diagnostics:  XR and MRI personally viewed, independently interpreted and radiology report was reviewed.  X-ray of the lumbar spine demonstrates diffuse spondylotic changes throughout the spine  MRI of the lumbar spine demonstrates mild to moderate stenosis from L2-S1 secondary to epidural lipomatosis.  There is status post right L5-S1 decompression with foraminal disc herniation and moderate foraminal stenosis at L4-5 and L5-S1      IMPRESSION: Yohan Cook is a 73 year old male with lumbar stenosis and radiculopathy    PLAN:   We had a detailed discussion outlining the etiology, anatomy, pathophysiology, and natural history of lumbar stenosis.    I discussed with patient surgical options, including L5-S1 TLIF.     The patient understood the benefits, alternatives and risks of the procedure with risks including but not limited to risk of anesthesia, infection, nerve damage, blood vessel damage, dural tear, blindness, need for further surgery, failure to relieve symptoms, adjacent level problems.The risk of non union, junctional degeneration, bone graft donor morbidity, the possible need for further surgery, the risk of instrumentation failure, as well as chronic pain were also explained.    Patient can call to schedule if symptoms do not improve with conservative treatment    I spent 30 minutes in preparation to see the patient, counseling/education of relevant pathology, discussing imaging results, ordering medication/therapy intervention, and care coordination.        Marcelo Esparza MD  Orthopedic Spine Surgeon  [facility]   [address] [place] [address].org  [email or web address]  t: [phone]   f: [phone]        This note was dictated using Dragon software.  While it was briefly proofread prior to completion, some grammatical, spelling, and word choice errors due to dictation may still  occur.

## 2024-05-06 DIAGNOSIS — I10 ESSENTIAL HYPERTENSION: ICD-10-CM

## 2024-05-07 RX ORDER — DILTIAZEM HYDROCHLORIDE 300 MG/1
300 CAPSULE, COATED, EXTENDED RELEASE ORAL
Qty: 90 CAPSULE | Refills: 1 | OUTPATIENT
Start: 2024-05-07

## 2024-05-07 NOTE — TELEPHONE ENCOUNTER
Please review.  Protocol failed / Has no protocol.     Requested Prescriptions   Pending Prescriptions Disp Refills    lisinopril 40 MG Oral Tab 90 tablet 0     Sig: Take 1 tablet (40 mg total) by mouth once daily.       Hypertension Medications Protocol Failed - 5/6/2024  9:32 AM        Failed - Last BP reading less than 140/90     BP Readings from Last 1 Encounters:   03/26/24 (!) 190/95               Passed - CMP or BMP in past 12 months        Passed - In person appointment or virtual visit in the past 12 mos or appointment in next 3 mos     Recent Outpatient Visits              3 weeks ago Lumbosacral radiculopathy at L5    [facility], [address] [place] [address], Marcelo Low MD    Office Visit    3 weeks ago Numbness of foot    [facility], [address] [place] [address], [place]    Office Visit    1 month ago Lumbosacral radiculopathy at L5    [facility], [address] [place] [address], [place] Junito Jones,     Office Visit    1 month ago Exposure to COVID-19 virus    [facility], [facility], [address] [place] [address], [place]    Office Visit    2 months ago Cellulitis of left arm    [facility], [address] [place] [address], Ellie Ledezma,     Office Visit                      Passed - EGFRCR or GFRNAA > 50     GFR Evaluation  EGFRCR: 85 , resulted on 2/19/2024           Refused Prescriptions Disp Refills    dilTIAZem  MG Oral Capsule SR 24 Hr 90 capsule 1     Sig: Take 1 capsule (300 mg total) by mouth once daily.       Hypertension Medications Protocol Failed - 5/6/2024  9:32 AM        Failed - Last BP reading less than 140/90     BP Readings from Last 1 Encounters:   03/26/24 (!) 190/95               Passed - CMP or BMP in past 12 months        Passed - In person appointment or virtual visit in the past 12 mos or appointment in next 3 mos     Recent Outpatient Visits              3 weeks ago Lumbosacral  radiculopathy at L5    [facility], [address] [place] [address], Marcelo Low MD    Office Visit    3 weeks ago Numbness of foot    [facility], [address] [place] [address], [place],     Office Visit    1 month ago Lumbosacral radiculopathy at L5    [facility], [address] [place] [address], [place],     Office Visit    1 month ago Exposure to COVID-19 virus    [facility], [facility]In [facility], [address] [place] [address], Viktoria Siddiqi APRN    Office Visit    2 months ago Cellulitis of left arm    [facility], [address] [place] [address], Ellie Ledezma,     Office Visit                      Passed - EGFRCR or GFRNAA > 50     GFR Evaluation  EGFRCR: 85 , resulted on 2/19/2024               Recent Outpatient Visits              3 weeks ago Lumbosacral radiculopathy at L5    [facility], [address] [place] [address], Marcelo Low MD    Office Visit    3 weeks ago Numbness of foot    [facility], [address] [place], [place] [address]Junito Lopez,     Office Visit    1 month ago Lumbosacral radiculopathy at L5    [facility], [address] [place] [address], Junito Dc,     Office Visit    1 month ago Exposure to COVID-19 virus    [facility], [facility], [address] [place] [address], Viktoria Siddiqi APRN    Office Visit    2 months ago Cellulitis of left arm    [facility], [address] [place] [address], Ellie Ledezma DO    Office Visit

## 2024-05-08 RX ORDER — LISINOPRIL 40 MG/1
40 TABLET ORAL
Qty: 90 TABLET | Refills: 0 | Status: SHIPPED | OUTPATIENT
Start: 2024-05-08

## 2024-05-09 RX ORDER — DULOXETINE 40 MG/1
40 CAPSULE, DELAYED RELEASE ORAL DAILY
Qty: 30 CAPSULE | Refills: 1 | Status: SHIPPED | OUTPATIENT
Start: 2024-05-09 | End: 2024-06-08

## 2024-05-09 NOTE — TELEPHONE ENCOUNTER
Refill Request    Medication request: DULoxetine 30 MG Oral Cap DR Particles  Take 1 capsule (30 mg total) by mouth daily.     LOV:4/10/2024 Junito Jones,    Due back to clinic per last office note:  No mention  NOV: Visit date not found      [place]: [place]10/2024 #30    Urine drug screen (if applicable): N/A  Pain contract: N/A    LOV plan (if weaning or changing medications): Per Dr. Jones's LOV notes: \"Trial addition of duloxetine to gabapentin for a period of a month, then consider reevaluation with Dr. Esparza to consider decompression. \"    NOTE:  s/w patient and he has felt some improvement on the duloxetine, but feels a higher dose may be needed. Routed to Dr. Jones to determine if move up to 40 or 60mg.

## 2024-07-30 DIAGNOSIS — I10 ESSENTIAL HYPERTENSION: ICD-10-CM

## 2024-07-31 DIAGNOSIS — I10 ESSENTIAL HYPERTENSION: ICD-10-CM

## 2024-08-03 RX ORDER — LISINOPRIL 40 MG/1
40 TABLET ORAL
Qty: 90 TABLET | Refills: 0 | Status: SHIPPED | OUTPATIENT
Start: 2024-08-03

## 2024-08-03 RX ORDER — DILTIAZEM HYDROCHLORIDE 300 MG/1
300 CAPSULE, COATED, EXTENDED RELEASE ORAL
Qty: 90 CAPSULE | Refills: 0 | Status: SHIPPED | OUTPATIENT
Start: 2024-08-03

## 2024-08-03 NOTE — TELEPHONE ENCOUNTER
Please review; protocol failed/ has no protocol    Requested Prescriptions   Pending Prescriptions Disp Refills    dilTIAZem  MG Oral Capsule SR 24 Hr 90 capsule 1     Sig: Take 1 capsule (300 mg total) by mouth once daily.       Hypertension Medications Protocol Failed - 7/30/2024 10:55 AM        Failed - Last BP reading less than 140/90     BP Readings from Last 1 Encounters:   03/26/24 (!) 190/95               Passed - CMP or BMP in past 12 months        Passed - In person appointment or virtual visit in the past 12 mos or appointment in next 3 mos     Recent Outpatient Visits              3 months ago Lumbosacral radiculopathy at L5    [facility], [address] [place] [address], Marcelo Low MD    Office Visit    3 months ago Numbness of foot    [facility], [address] [place] [address], [place]    Office Visit    4 months ago Lumbosacral radiculopathy at L5    [facility], [address] [place] [address], [place],     Office Visit    4 months ago Exposure to COVID-19 virus    [facility], [facility]In [facility], [address] [place] [address], [place]    Office Visit    5 months ago Cellulitis of left arm    [facility], [address] [place] [address], Ellie Ledezma DO    Office Visit                      Passed - EGFRCR or GFRNAA > 50     GFR Evaluation  EGFRCR: 85 , resulted on 2/19/2024            lisinopril 40 MG Oral Tab 90 tablet 0     Sig: Take 1 tablet (40 mg total) by mouth once daily.       Hypertension Medications Protocol Failed - 7/30/2024 10:55 AM        Failed - Last BP reading less than 140/90     BP Readings from Last 1 Encounters:   03/26/24 (!) 190/95               Passed - CMP or BMP in past 12 months        Passed - In person appointment or virtual visit in the past 12 mos or appointment in next 3 mos     Recent Outpatient Visits              3 months ago Lumbosacral radiculopathy at [address]  [facility], [address] [place] [address], Marcelo Low MD    Office Visit    3 months ago Numbness of foot    [facility], [address] [place] [address], [place],     Office Visit    4 months ago Lumbosacral radiculopathy at L5    [facility], [address] [place] [address], Junito Dc,     Office Visit    4 months ago Exposure to COVID-19 virus    [facility], [facility], [address] [place] [address], Viktoria Siddiqi APRN    Office Visit    5 months ago Cellulitis of left arm    [facility], [address] [place] [address], Ellie Ledezma,     Office Visit                      Passed - EGFRCR or GFRNAA > 50     GFR Evaluation  EGFRCR: 85 , resulted on 2/19/2024             Recent Outpatient Visits              3 months ago Lumbosacral radiculopathy at L5    [facility], [address] [place] [address], Marcelo Low MD    Office Visit    3 months ago Numbness of foot    [facility], [address] [place] [address], [place],     Office Visit    4 months ago Lumbosacral radiculopathy at L5    [facility], [address] [place] [address], Junito Dc,     Office Visit    4 months ago Exposure to COVID-19 virus    [facility], [facility], [address] [place] [address], Viktoria Siddiqi APRN    Office Visit    5 months ago Cellulitis of left arm    [facility], [address] [place] [address], Ellie Ledezma DO    Office Visit

## 2024-08-05 RX ORDER — DILTIAZEM HYDROCHLORIDE 300 MG/1
300 CAPSULE, COATED, EXTENDED RELEASE ORAL
Qty: 90 CAPSULE | Refills: 0 | OUTPATIENT
Start: 2024-08-05

## 2024-08-05 RX ORDER — LISINOPRIL 40 MG/1
40 TABLET ORAL
Qty: 90 TABLET | Refills: 0 | OUTPATIENT
Start: 2024-08-05

## 2024-10-26 DIAGNOSIS — I10 ESSENTIAL HYPERTENSION: ICD-10-CM

## 2024-10-29 RX ORDER — LISINOPRIL 40 MG/1
40 TABLET ORAL
Qty: 90 TABLET | Refills: 0 | Status: SHIPPED | OUTPATIENT
Start: 2024-10-29

## 2024-10-29 RX ORDER — DILTIAZEM HYDROCHLORIDE 300 MG/1
300 CAPSULE, COATED, EXTENDED RELEASE ORAL
Qty: 90 CAPSULE | Refills: 0 | Status: SHIPPED | OUTPATIENT
Start: 2024-10-29

## 2024-10-29 RX ORDER — MELOXICAM 15 MG/1
15 TABLET ORAL DAILY PRN
Qty: 30 TABLET | Refills: 0 | Status: SHIPPED | OUTPATIENT
Start: 2024-10-29

## 2024-10-29 NOTE — TELEPHONE ENCOUNTER
Please review; protocol failed/ has no protocol    Please see message below for upcoming appointment.    Future Appointments   Date Time Provider Department Center   11/5/2024 10:30 AM Ellie Iglesias DO [facility]   '  Requested Prescriptions   Pending Prescriptions Disp Refills    dilTIAZem  MG Oral Capsule SR 24 Hr 90 capsule 0     Sig: Take 1 capsule (300 mg total) by mouth once daily.       Hypertension Medications Protocol Failed - 10/29/2024 11:17 AM        Failed - Last BP reading less than 140/90     BP Readings from Last 1 Encounters:   03/26/24 (!) 190/95               Passed - CMP or BMP in past 12 months        Passed - In person appointment or virtual visit in the past 12 mos or appointment in next 3 mos     Recent Outpatient Visits              6 months ago Lumbosacral radiculopathy at L5    [address] Marcelo Esparza MD    Office Visit    6 months ago Numbness of foot    [address] Junito Jones DO    Office Visit    7 months ago Lumbosacral radiculopathy at L5    [address] Junito Jones DO    Office Visit    7 months ago Exposure to COVID-19 virus    [facility], [facility]In [facility], [address] [place] [address] Viktoria Ritchie APRN    Office Visit    8 months ago Cellulitis of left arm    [address] Ellie Iglesias DO    Office Visit          Future Appointments         Provider Department Appt Notes    In 1 week Ellie Iglesias DO [address] 6 month visit. ja                    Passed - EGFRCR or GFRNAA > 50     GFR Evaluation  EGFRCR: 85 , resulted on 2/19/2024            lisinopril 40 MG Oral Tab 90 tablet 0     Sig: Take 1 tablet (40 mg total) by mouth once daily.       Hypertension Medications Protocol Failed - 10/29/2024 11:17 AM        Failed - Last BP  reading less than 140/90     BP Readings from Last 1 Encounters:   03/26/24 (!) 190/95               Passed - CMP or BMP in past 12 months        Passed - In person appointment or virtual visit in the past 12 mos or appointment in next 3 mos     Recent Outpatient Visits              6 months ago Lumbosacral radiculopathy at L5    [address] Marcelo Low MD    Office Visit    6 months ago Numbness of foot    [address]    Office Visit    7 months ago Lumbosacral radiculopathy at L5    [address] Junito Jones DO    Office Visit    7 months ago Exposure to COVID-19 virus    [facility], Walk-In Clinic, [address] [place] [address] Viktoria Ritchie APRN    Office Visit    8 months ago Cellulitis of left arm    [address] Ellie Iglesias DO    Office Visit          Future Appointments         Provider Department Appt Notes    In 1 week Ellie Iglesias DO [address] 6 month visit. ja                    Passed - EGFRCR or GFRNAA > 50     GFR Evaluation  EGFRCR: 85 , resulted on 2/19/2024           Signed Prescriptions Disp Refills    Meloxicam 15 MG Oral Tab 30 tablet 0     Sig: Take 1 tablet (15 mg total) by mouth daily as needed.       Non-Narcotic Pain Medication Protocol Passed - 10/29/2024 11:17 AM        Passed - In person appointment or virtual visit in the past 6 mos or appointment in next 3 mos     Recent Outpatient Visits              6 months ago Lumbosacral radiculopathy at L5    [address] Marcelo Low MD    Office Visit    6 months ago Numbness of foot    [address]    Office Visit    7 months ago Lumbosacral radiculopathy at [address]  [facility], [address] [place], [place] [address]Jun[place] Lopez DO    Office Visit    7 months ago Exposure to COVID-19 virus    [facility], [facility]In [facility], [address] [place] [address], [place]    Office Visit    8 months ago Cellulitis of left arm    [facility], [address] [place] [address] Ellie Iglesias DO    Office Visit          Future Appointments         Provider Department Appt Notes    In 1 week Ellie Iglesias DO [address] 6 month visit. romeo                       Recent Outpatient Visits              6 months ago Lumbosacral radiculopathy at L5    [facility], [address] [place] [address] Marcelo Esparza MD    Office Visit    6 months ago Numbness of foot    [facility], [address] [place] [address], [place]    Office Visit    7 months ago Lumbosacral radiculopathy at L5    [facility], [address] [place] [address], [place],     Office Visit    7 months ago Exposure to COVID-19 virus    [facility], [facility], [address] [place] [address], [place]    Office Visit    8 months ago Cellulitis of left arm    [facility], [address] [place] [address] Ellie Iglesias DO    Office Visit          Future Appointments         Provider Department Appt Notes    In 1 week Ellie Iglesias DO [address] 6 month visit. romeo

## 2024-10-29 NOTE — TELEPHONE ENCOUNTER
Refill passed per [facility] protocol.  Requested Prescriptions   Pending Prescriptions Disp Refills    Meloxicam 15 MG Oral Tab 30 tablet 0     Sig: Take 1 tablet (15 mg total) by mouth daily as needed.       Non-Narcotic Pain Medication Protocol Passed - 10/29/2024 11:17 AM        Passed - In person appointment or virtual visit in the past 6 mos or appointment in next 3 mos     Recent Outpatient Visits              6 months ago Lumbosacral radiculopathy at L5    [address] Marcelo Low MD    Office Visit    6 months ago Numbness of foot    [address]    Office Visit    7 months ago Lumbosacral radiculopathy at L5    [address] Junito Jones DO    Office Visit    7 months ago Exposure to COVID-19 virus    [facility], [facility], [address] [place] [address] Viktoria Ritchie APRN    Office Visit    8 months ago Cellulitis of left arm    [address] Ellie Iglesias DO    Office Visit          Future Appointments         Provider Department Appt Notes    In 1 week Ellie gIlesias DO [address] 6 month visit. ja                      dilTIAZem  MG Oral Capsule SR 24 Hr 90 capsule 0     Sig: Take 1 capsule (300 mg total) by mouth once daily.       Hypertension Medications Protocol Failed - 10/29/2024 11:17 AM        Failed - Last BP reading less than 140/90     BP Readings from Last 1 Encounters:   03/26/24 (!) 190/95               Passed - CMP or BMP in past 12 months        Passed - In person appointment or virtual visit in the past 12 mos or appointment in next 3 mos     Recent Outpatient Visits              6 months ago Lumbosacral radiculopathy at L5    [address] Marcelo Low MD    Office Visit    6 months ago  Numbness of foot    [facility], [address] [place], [place] [address]Junito Lopez DO    Office Visit    7 months ago Lumbosacral radiculopathy at L5    [address]    Office Visit    7 months ago Exposure to COVID-19 virus    [facility], [facility], [address] [place], [place] [address]Viktoria Cardozo APRN    Office Visit    8 months ago Cellulitis of left arm    [address] Ellie Iglesias DO    Office Visit          Future Appointments         Provider Department Appt Notes    In 1 week Ellie Iglesias DO [address] 6 month visit. ja                    Passed - EGFRCR or GFRNAA > 50     GFR Evaluation  EGFRCR: 85 , resulted on 2/19/2024            lisinopril 40 MG Oral Tab 90 tablet 0     Sig: Take 1 tablet (40 mg total) by mouth once daily.       Hypertension Medications Protocol Failed - 10/29/2024 11:17 AM        Failed - Last BP reading less than 140/90     BP Readings from Last 1 Encounters:   03/26/24 (!) 190/95               Passed - CMP or BMP in past 12 months        Passed - In person appointment or virtual visit in the past 12 mos or appointment in next 3 mos     Recent Outpatient Visits              6 months ago Lumbosacral radiculopathy at L5    [address] Marcelo Esparza MD    Office Visit    6 months ago Numbness of foot    [address]    Office Visit    7 months ago Lumbosacral radiculopathy at L5    [address]    Office Visit    7 months ago Exposure to COVID-19 virus    [facility], [facility], [address] [place], [place] [address]Viktoria Saldana APRN    Office Visit    8 months ago Cellulitis of left arm    [address]  Ellie Iglesias DO    Office Visit          Future Appointments         Provider Department Appt Notes    In 1 week Ellie Iglesias DO [address] 6 month visit. romeo                    Passed - EGFRCR or GFRNAA > 50     GFR Evaluation  EGFRCR: 85 , resulted on 2/19/2024             Recent Outpatient Visits              6 months ago Lumbosacral radiculopathy at L5    [address] Marcelo Esparza MD    Office Visit    6 months ago Numbness of foot    [address] Junito Jones DO    Office Visit    7 months ago Lumbosacral radiculopathy at L5    [address] Junito Jones DO    Office Visit    7 months ago Exposure to COVID-19 virus    [facility], [facility]In Clinic, [address] [place] [address] Viktoria Ritchie APRN    Office Visit    8 months ago Cellulitis of left arm    [address] Ellie Iglesias DO    Office Visit          Future Appointments         Provider Department Appt Notes    In 1 week Ellie Iglesias DO [address] 6 month visit. romeo

## 2024-11-05 ENCOUNTER — OFFICE VISIT (OUTPATIENT)
Dept: INTERNAL MEDICINE CLINIC | Facility: CLINIC | Age: 74
End: 2024-11-05
Payer: MEDICARE

## 2024-11-05 VITALS
DIASTOLIC BLOOD PRESSURE: 76 MMHG | OXYGEN SATURATION: 98 % | BODY MASS INDEX: 32 KG/M2 | TEMPERATURE: 98 F | WEIGHT: 226.63 LBS | HEART RATE: 84 BPM | SYSTOLIC BLOOD PRESSURE: 134 MMHG

## 2024-11-05 DIAGNOSIS — F41.9 ANXIETY: ICD-10-CM

## 2024-11-05 DIAGNOSIS — I10 ESSENTIAL HYPERTENSION: Primary | ICD-10-CM

## 2024-11-05 PROBLEM — D17.79 EPIDURAL LIPOMATOSIS: Status: ACTIVE | Noted: 2024-04-25

## 2024-11-05 PROBLEM — M51.379 DDD (DEGENERATIVE DISC DISEASE), LUMBOSACRAL: Status: ACTIVE | Noted: 2024-04-25

## 2024-11-05 PROBLEM — E88.2 EPIDURAL LIPOMATOSIS: Status: ACTIVE | Noted: 2024-04-25

## 2024-11-05 PROCEDURE — 99214 OFFICE O/P EST MOD 30 MIN: CPT | Performed by: FAMILY MEDICINE

## 2024-11-05 RX ORDER — ALPRAZOLAM 0.5 MG
0.5 TABLET ORAL NIGHTLY PRN
Qty: 30 TABLET | Refills: 0 | Status: SHIPPED | OUTPATIENT
Start: 2024-11-05

## 2024-11-05 NOTE — PROGRESS NOTES
Subjective:   Patient ID: Yohan Cook is a 74 year old male.    HPI Here for f/u on BP. Taking meds as prescribed with no issues.   Requesting Xanax refill. Anticipates increased anxiety as he is closing on his house and building a new house. Takes rarely.    History/Other:   Review of Systems   Respiratory:  Negative for chest tightness and shortness of breath.    Cardiovascular:  Negative for chest pain and palpitations.   Neurological:  Negative for dizziness and headaches.     Current Outpatient Medications   Medication Sig Dispense Refill    dilTIAZem  MG Oral Capsule SR 24 Hr Take 1 capsule (300 mg total) by mouth once daily. 90 capsule 0    lisinopril 40 MG Oral Tab Take 1 tablet (40 mg total) by mouth once daily. 90 tablet 0    ALPRAZolam (XANAX) 0.5 MG Oral Tab Take 1 tablet (0.5 mg total) by mouth nightly as needed. 30 tablet 0    Meloxicam 15 MG Oral Tab Take 1 tablet (15 mg total) by mouth daily as needed. 30 tablet 0    Multiple Vitamins-Minerals (MULTI-VITAMIN/MINERALS) Oral Tab Take 1 tablet by mouth daily.       Allergies:Allergies[1]    Objective:   Physical Exam  Vitals reviewed.   Constitutional:       Appearance: Normal appearance. He is well-developed.   HENT:      Head: Normocephalic and atraumatic.   Cardiovascular:      Rate and Rhythm: Normal rate and regular rhythm.      Heart sounds: Normal heart sounds.   Pulmonary:      Effort: Pulmonary effort is normal.      Breath sounds: Normal breath sounds.   Neurological:      Mental Status: He is alert.   Psychiatric:         Mood and Affect: Mood normal.         Behavior: Behavior normal.         Assessment & Plan:   1. Essential hypertension    2. Anxiety    BP controlled on med. Continue.   Xanax PRN. Discussed risks/benefits/potential side effects and proper use of medication.       No orders of the defined types were placed in this encounter.      Meds This Visit:  Requested Prescriptions     Signed Prescriptions Disp Refills     ALPRAZolam (XANAX) 0.5 MG Oral Tab 30 tablet 0     Sig: Take 1 tablet (0.5 mg total) by mouth nightly as needed.       Imaging & Referrals:  None         [1] No Known Allergies

## 2024-12-13 DIAGNOSIS — I10 ESSENTIAL HYPERTENSION: ICD-10-CM

## 2024-12-18 DIAGNOSIS — F41.9 ANXIETY: ICD-10-CM

## 2024-12-18 RX ORDER — LISINOPRIL 40 MG/1
40 TABLET ORAL
Qty: 90 TABLET | Refills: 3 | Status: SHIPPED | OUTPATIENT
Start: 2024-12-18

## 2024-12-18 NOTE — TELEPHONE ENCOUNTER
REFILL PASSED PER [facility] PROTOCOLS    Requested Prescriptions   Pending Prescriptions Disp Refills    LISINOPRIL 40 MG Oral Tab [Pharmacy Med Name: Lisinopril 40 Mg Tab Lupi] 90 tablet 0     Sig: Take 1 tablet (40 mg total) by mouth once daily.       Hypertension Medications Protocol Passed - 12/18/2024 10:38 AM        Passed - CMP or BMP in past 12 months        Passed - Last BP reading less than 140/90     BP Readings from Last 1 Encounters:   11/05/24 134/76               Passed - In person appointment or virtual visit in the past 12 mos or appointment in next 3 mos     Recent Outpatient Visits              1 month ago Essential hypertension    [address] Ellie Iglesias DO    Office Visit    8 months ago Lumbosacral radiculopathy at L5    [address] Marcelo Esparza MD    Office Visit    8 months ago Numbness of foot    [address] Junito Jones DO    Office Visit    9 months ago Lumbosacral radiculopathy at L5    [address] Junito Jones DO    Office Visit    9 months ago Exposure to COVID-19 virus    [facility], Walk-In Clinic, [address] [place] [address] Viktoria Ritchie APRN    Office Visit          Future Appointments         Provider Department Appt Notes    In 2 months Ellie Iglesias DO [address] Annual Exam,last CPE-2/23/2024                     Passed - EGFRCR or GFRNAA > 50     GFR Evaluation  EGFRCR: 85 , resulted on 2/19/2024               Future Appointments         Provider Department Appt Notes    In 2 months Ellie Iglesias DO [address] Annual Exam,last CPE-2/23/2024           Recent Outpatient Visits              1 month ago Essential hypertension    [address]  Ellie Iglesias,     Office Visit    8 months ago Lumbosacral radiculopathy at L5    [facility] [place], [address] [place] [address], Marcelo Low MD    Office Visit    8 months ago Numbness of foot    [facility] [place], [address] [place] [address], LotusJunito Bland DO    Office Visit    9 months ago Lumbosacral radiculopathy at L5    [facility] [place], [address] [place] [address] Junito Jones DO    Office Visit    9 months ago Exposure to COVID-19 virus    [facility] [place], [facility], [address] [place] [address] Viktoria Ritchie APRN    Office Visit

## 2024-12-24 RX ORDER — ALPRAZOLAM 0.5 MG
0.5 TABLET ORAL NIGHTLY PRN
Qty: 30 TABLET | Refills: 0 | Status: SHIPPED | OUTPATIENT
Start: 2024-12-24

## 2024-12-24 RX ORDER — MELOXICAM 15 MG/1
15 TABLET ORAL
Qty: 30 TABLET | Refills: 0 | OUTPATIENT
Start: 2024-12-24

## 2024-12-24 RX ORDER — MELOXICAM 15 MG/1
15 TABLET ORAL DAILY PRN
Qty: 90 TABLET | Refills: 0 | Status: SHIPPED | OUTPATIENT
Start: 2024-12-24

## 2024-12-24 NOTE — TELEPHONE ENCOUNTER
Please review; protocol failed/No Protocol    Meloxicam- short supply given please advise if refill is appropriate.     Recent Fills: 11/05/2024    Last Rx Written: 11/05/2024    Last Office Visit: 11/05/2024  Future Appointments   Date Time Provider Department Center   3/10/2025  9:00 AM Ellie Iglesias DO [facility]     Requested Prescriptions   Pending Prescriptions Disp Refills    Meloxicam 15 MG Oral Tab 30 tablet 0     Sig: Take 1 tablet (15 mg total) by mouth daily as needed.       Non-Narcotic Pain Medication Protocol Passed - 12/24/2024 10:36 AM        Passed - In person appointment or virtual visit in the past 6 mos or appointment in next 3 mos     Recent Outpatient Visits              1 month ago Essential hypertension    [address] Ellie Iglesias DO    Office Visit    8 months ago Lumbosacral radiculopathy at L5    [address] Marcelo Epsarza MD    Office Visit    8 months ago Numbness of foot    [address] Junito Jones DO    Office Visit    9 months ago Lumbosacral radiculopathy at L5    [address] Junito Jones DO    Office Visit    9 months ago Exposure to COVID-19 virus    [facility], Walk[facility]In Clinic, [address] [place] [address] Viktoria Ritchie APRN    Office Visit          Future Appointments         Provider Department Appt Notes    In 2 months Ellie Iglesias DO [address] Annual Exam,last CPE-2/23/2024                       ALPRAZolam (XANAX) 0.5 MG Oral Tab 30 tablet 0     Sig: Take 1 tablet (0.5 mg total) by mouth nightly as needed.       Controlled Substance Medication Failed - 12/24/2024 10:36 AM        Failed - This medication is a controlled substance - forward to provider to refill           Future Appointments         Provider Department Appt  Notes    In 2 months Ellie Iglesias DO [facility], [address] [place] [address] Annual Exam,last CPE-2/23/2024           Recent Outpatient Visits              1 month ago Essential hypertension    [facility], [address] [place] [address] Ellie Iglesias DO    Office Visit    8 months ago Lumbosacral radiculopathy at L5    [facility], [address] [place] [address] Marcelo Esparza MD    Office Visit    8 months ago Numbness of foot    [facility], [address] [place] [address] Junito Jones DO    Office Visit    9 months ago Lumbosacral radiculopathy at L5    [facility], [address] [place] [address] Junito Jones DO    Office Visit    9 months ago Exposure to COVID-19 virus    [facility], [facility]In [facility], [address] [place] [address] Viktoria Ritchie APRN    Office Visit

## 2025-01-27 DIAGNOSIS — I10 ESSENTIAL HYPERTENSION: ICD-10-CM

## 2025-01-29 DIAGNOSIS — I10 ESSENTIAL HYPERTENSION: ICD-10-CM

## 2025-01-30 ENCOUNTER — PATIENT MESSAGE (OUTPATIENT)
Dept: INTERNAL MEDICINE CLINIC | Facility: CLINIC | Age: 75
End: 2025-01-30

## 2025-01-31 RX ORDER — DILTIAZEM HYDROCHLORIDE 300 MG/1
300 CAPSULE, COATED, EXTENDED RELEASE ORAL
Qty: 90 CAPSULE | Refills: 3 | Status: SHIPPED | OUTPATIENT
Start: 2025-01-31

## 2025-01-31 RX ORDER — DILTIAZEM HYDROCHLORIDE 300 MG/1
300 CAPSULE, COATED, EXTENDED RELEASE ORAL DAILY
Qty: 90 CAPSULE | Refills: 0 | OUTPATIENT
Start: 2025-01-31

## 2025-01-31 RX ORDER — LISINOPRIL 40 MG/1
40 TABLET ORAL
Qty: 90 TABLET | Refills: 3 | Status: SHIPPED | OUTPATIENT
Start: 2025-01-31

## 2025-01-31 RX ORDER — LISINOPRIL 40 MG/1
40 TABLET ORAL DAILY
Qty: 90 TABLET | Refills: 3 | OUTPATIENT
Start: 2025-01-31

## 2025-01-31 NOTE — TELEPHONE ENCOUNTER
Refill Per Protocol     Requested Prescriptions   Pending Prescriptions Disp Refills    dilTIAZem  MG Oral Capsule SR 24 Hr 90 capsule 0     Sig: Take 1 capsule (300 mg total) by mouth once daily.       Hypertension Medications Protocol Passed - 1/31/2025 10:47 AM        Passed - CMP or BMP in past 12 months        Passed - Last BP reading less than 140/90     BP Readings from Last 1 Encounters:   11/05/24 134/76               Passed - In person appointment or virtual visit in the past 12 mos or appointment in next 3 mos     Recent Outpatient Visits              2 months ago Essential hypertension    [address] Ellie Iglesias DO    Office Visit    9 months ago Lumbosacral radiculopathy at L5    [address] Marcelo Esparza MD    Office Visit    9 months ago Numbness of foot    [address]    Office Visit    10 months ago Lumbosacral radiculopathy at L5    [address] Junito Jones DO    Office Visit    10 months ago Exposure to COVID-19 virus    [facility], [facility]In Clinic, [address] [place] [address] Viktoria Ritchie APRN    Office Visit          Future Appointments         Provider Department Appt Notes    In 1 month Ellie Iglesias DO [address] Annual Exam,last CPE-2/23/2024                     Passed - EGFRCR or GFRNAA > 50     GFR Evaluation  EGFRCR: 85 , resulted on 2/19/2024          Passed - Medication is active on med list          lisinopril 40 MG Oral Tab 90 tablet 3     Sig: Take 1 tablet (40 mg total) by mouth once daily.       Hypertension Medications Protocol Passed - 1/31/2025 10:47 AM        Passed - CMP or BMP in past 12 months        Passed - Last BP reading less than 140/90     BP Readings from Last 1 Encounters:   11/05/24 134/76                Passed - In person appointment or virtual visit in the past 12 mos or appointment in next 3 mos     Recent Outpatient Visits              2 months ago Essential hypertension    [facility], [address] [place], [place] [address]Ellie Pires DO    Office Visit    9 months ago Lumbosacral radiculopathy at L5    [facility], [address] [place] [address] Marcelo Low MD    Office Visit    9 months ago Numbness of foot    [facility], [address] [place], [place] [address]Junito Lopez DO    Office Visit    10 months ago Lumbosacral radiculopathy at L5    [facility], [address] [place], [place] [address]Junito Lopez DO    Office Visit    10 months ago Exposure to COVID-19 virus    [facility], [facility], [address] [place] [address] CheritonViktoria Cardozo APRN    Office Visit          Future Appointments         Provider Department Appt Notes    In 1 month Ellie Iglesias DO [facility], [address] [place] [address] Annual Exam,last CPE-2/23/2024                     Passed - EGFRCR or GFRNAA > 50     GFR Evaluation  EGFRCR: 85 , resulted on 2/19/2024          Passed - Medication is active on med list               Future Appointments         Provider Department Appt Notes    In 1 month Ellie Iglesias DO [facility], [address] [place] [address] Annual Exam,last CPE-2/23/2024           Recent Outpatient Visits              2 months ago Essential hypertension    [facility], [address] [place] [address] Ellie Iglesias DO    Office Visit    9 months ago Lumbosacral radiculopathy at L5    [facility], [address] [place] [address] Marcelo Low MD    Office Visit    9 months ago Numbness of foot    [facility], [address] [place] [address] Junito Dc DO    Office Visit    10 months ago Lumbosacral radiculopathy at L5    [facility], [address] [place], [place] [address]Luis Joseph, DO    Office  Visit    10 months ago Exposure to COVID-19 virus    [facility], Walk-In Clinic, [address], [place] Viktoria Ritchie, JUDE    Office Visit

## 2025-02-04 ENCOUNTER — TELEPHONE (OUTPATIENT)
Dept: INTERNAL MEDICINE CLINIC | Facility: CLINIC | Age: 75
End: 2025-02-04

## 2025-02-04 DIAGNOSIS — Z00.00 ENCOUNTER FOR ANNUAL HEALTH EXAMINATION: Primary | ICD-10-CM

## 2025-02-04 DIAGNOSIS — R73.02 IMPAIRED GLUCOSE TOLERANCE: ICD-10-CM

## 2025-02-04 DIAGNOSIS — Z12.5 ENCOUNTER FOR SCREENING FOR MALIGNANT NEOPLASM OF PROSTATE: ICD-10-CM

## 2025-02-04 DIAGNOSIS — I10 ESSENTIAL HYPERTENSION: ICD-10-CM

## 2025-02-04 NOTE — TELEPHONE ENCOUNTER
Patient called request labs prior to their annual physical.  Annual physical scheduled for 4/11/25 .   Please order labs. Patient preferred lab is [facility]  Patient informed request was sent to clinical team.  Patient informed to fast for labs.  No callback required.

## 2025-02-10 DIAGNOSIS — F41.9 ANXIETY: ICD-10-CM

## 2025-02-13 RX ORDER — ALPRAZOLAM 0.5 MG
0.5 TABLET ORAL NIGHTLY PRN
Qty: 30 TABLET | Refills: 0 | OUTPATIENT
Start: 2025-02-13

## 2025-02-13 RX ORDER — ALPRAZOLAM 0.5 MG
0.5 TABLET ORAL NIGHTLY PRN
Qty: 20 TABLET | Refills: 0 | Status: SHIPPED | OUTPATIENT
Start: 2025-02-13

## 2025-02-13 NOTE — TELEPHONE ENCOUNTER
Please review; protocol failed/No Protocol    Recent Fills: 11/05/2024, 12/24/2024    Last Rx Written: 12/24/2024    Last Office Visit: 11/05/2024  Future Appointments   Date Time Provider Department Center   4/11/2025 10:00 AM Ellie Iglesias DO [facility]       Requested Prescriptions   Pending Prescriptions Disp Refills    ALPRAZOLAM 0.5 MG Oral Tab [Pharmacy Med Name: ALPRAZOLAM 0.5MG TABLETS] 30 tablet 0     Sig: TAKE 1 TABLET(0.5 MG) BY MOUTH EVERY NIGHT AS NEEDED       Controlled Substance Medication Failed - 2/13/2025  3:43 PM        Failed - This medication is a controlled substance - forward to provider to refill        Failed - Medication is active on med list           Future Appointments         Provider Department Appt Notes    In 1 month Ellie Iglesias DO [facility], [address] [place] [address] Annual Exam,last CPE-2/23/2024  ok jdl 2/4/25 jdl          Recent Outpatient Visits              3 months ago Essential hypertension    [address] Ellie Iglesias DO    Office Visit    10 months ago Lumbosacral radiculopathy at L5    [address] Marcelo Esparza MD    Office Visit    10 months ago Numbness of foot    [address]    Office Visit    11 months ago Lumbosacral radiculopathy at L5    [address] Junito Jones DO    Office Visit    11 months ago Exposure to COVID-19 virus    [facility], [facility], [address] [place] [address] Viktoria Ritchie APRN    Office Visit

## 2025-02-27 ENCOUNTER — TELEPHONE (OUTPATIENT)
Dept: INTERNAL MEDICINE CLINIC | Facility: CLINIC | Age: 75
End: 2025-02-27

## 2025-02-27 RX ORDER — MELOXICAM 15 MG/1
15 TABLET ORAL DAILY PRN
Qty: 90 TABLET | Refills: 0 | Status: SHIPPED | OUTPATIENT
Start: 2025-02-27

## 2025-02-27 NOTE — TELEPHONE ENCOUNTER
RN to pt call, left detailed VM on identified mailbox per phone consent.  Informed pt of need for evaluation when he returns to [place].  Provided name/location of pharmacy to where medication was sent. Asked to callback office if he has any questions.      Outpatient Medication Detail   Disp Refills Start End    Meloxicam 15 MG Oral Tab 90 tablet 0 2/27/2025 --    Sig - Route: Take 1 tablet (15 mg total) by mouth daily as needed. - Oral    Sent to pharmacy as: Meloxicam 15 MG Oral Tablet    E-Prescribing Status: Receipt confirmed by pharmacy (2/27/2025  9:28 AM CST)      Pharmacy  [id number] [address] AT [facility] [address], [phone], [phone]

## 2025-02-27 NOTE — TELEPHONE ENCOUNTER
Triage call transferred.   Spoke with pt, currently in [place] and experiencing back spasms. No c/o any other sx at this time. Per pt, hx of back spasm and a couple years ago and PCP provided muscle relaxer and steroids, which helped resolve pain. Pt is driving back from [place] today and will be back in town on Monday. Informed should be seen for eval/tx. Advised if sx worsen to go to local [facility].   Per pt requesting meds to be refilled today. Informed will relay to PCP rx request. Confirmed [facility] listed.     Please advise. Thank you.

## 2025-02-27 NOTE — TELEPHONE ENCOUNTER
I refilled his Meloxicam but he should be seen and examined to confirm diagnosis and for additional meds if Meloxicam doesn't help.

## 2025-04-07 ENCOUNTER — LABORATORY ENCOUNTER (OUTPATIENT)
Dept: LAB | Age: 75
End: 2025-04-07
Attending: FAMILY MEDICINE
Payer: MEDICARE

## 2025-04-07 DIAGNOSIS — I10 ESSENTIAL HYPERTENSION: ICD-10-CM

## 2025-04-07 DIAGNOSIS — Z00.00 ENCOUNTER FOR ANNUAL HEALTH EXAMINATION: ICD-10-CM

## 2025-04-07 DIAGNOSIS — Z12.5 ENCOUNTER FOR SCREENING FOR MALIGNANT NEOPLASM OF PROSTATE: ICD-10-CM

## 2025-04-07 DIAGNOSIS — R73.02 IMPAIRED GLUCOSE TOLERANCE: ICD-10-CM

## 2025-04-07 LAB
ALBUMIN SERPL-MCNC: 4.7 G/DL (ref 3.2–4.8)
ALBUMIN/GLOB SERPL: 1.7 {RATIO} (ref 1–2)
ALP LIVER SERPL-CCNC: 61 U/L
ALT SERPL-CCNC: 45 U/L
ANION GAP SERPL CALC-SCNC: 14 MMOL/L (ref 0–18)
AST SERPL-CCNC: 46 U/L (ref ?–34)
BASOPHILS # BLD AUTO: 0.09 X10(3) UL (ref 0–0.2)
BASOPHILS NFR BLD AUTO: 1.3 %
BILIRUB SERPL-MCNC: 0.5 MG/DL (ref 0.2–1.1)
BUN BLD-MCNC: 18 MG/DL (ref 9–23)
CALCIUM BLD-MCNC: 10.4 MG/DL (ref 8.7–10.6)
CHLORIDE SERPL-SCNC: 102 MMOL/L (ref 98–112)
CHOLEST SERPL-MCNC: 198 MG/DL (ref ?–200)
CO2 SERPL-SCNC: 26 MMOL/L (ref 21–32)
COMPLEXED PSA SERPL-MCNC: 4.8 NG/ML (ref ?–4)
CREAT BLD-MCNC: 1.05 MG/DL
CREAT UR-SCNC: 73.5 MG/DL
EGFRCR SERPLBLD CKD-EPI 2021: 75 ML/MIN/1.73M2 (ref 60–?)
EOSINOPHIL # BLD AUTO: 0.05 X10(3) UL (ref 0–0.7)
EOSINOPHIL NFR BLD AUTO: 0.7 %
ERYTHROCYTE [DISTWIDTH] IN BLOOD BY AUTOMATED COUNT: 12 %
EST. AVERAGE GLUCOSE BLD GHB EST-MCNC: 123 MG/DL (ref 68–126)
FASTING PATIENT LIPID ANSWER: YES
FASTING STATUS PATIENT QL REPORTED: YES
GLOBULIN PLAS-MCNC: 2.8 G/DL (ref 2–3.5)
GLUCOSE BLD-MCNC: 129 MG/DL (ref 70–99)
HBA1C MFR BLD: 5.9 % (ref ?–5.7)
HCT VFR BLD AUTO: 50.2 %
HDLC SERPL-MCNC: 103 MG/DL (ref 40–59)
HGB BLD-MCNC: 17.2 G/DL
IMM GRANULOCYTES # BLD AUTO: 0.06 X10(3) UL (ref 0–1)
IMM GRANULOCYTES NFR BLD: 0.9 %
LDLC SERPL CALC-MCNC: 79 MG/DL (ref ?–100)
LYMPHOCYTES # BLD AUTO: 2.02 X10(3) UL (ref 1–4)
LYMPHOCYTES NFR BLD AUTO: 29.7 %
MCH RBC QN AUTO: 36.1 PG (ref 26–34)
MCHC RBC AUTO-ENTMCNC: 34.3 G/DL (ref 31–37)
MCV RBC AUTO: 105.2 FL
MICROALBUMIN UR-MCNC: 0.6 MG/DL
MICROALBUMIN/CREAT 24H UR-RTO: 8.2 UG/MG (ref ?–30)
MONOCYTES # BLD AUTO: 0.82 X10(3) UL (ref 0.1–1)
MONOCYTES NFR BLD AUTO: 12.1 %
NEUTROPHILS # BLD AUTO: 3.75 X10 (3) UL (ref 1.5–7.7)
NEUTROPHILS # BLD AUTO: 3.75 X10(3) UL (ref 1.5–7.7)
NEUTROPHILS NFR BLD AUTO: 55.3 %
NONHDLC SERPL-MCNC: 95 MG/DL (ref ?–130)
OSMOLALITY SERPL CALC.SUM OF ELEC: 298 MOSM/KG (ref 275–295)
PLATELET # BLD AUTO: 201 10(3)UL (ref 150–450)
POTASSIUM SERPL-SCNC: 4.7 MMOL/L (ref 3.5–5.1)
PROT SERPL-MCNC: 7.5 G/DL (ref 5.7–8.2)
RBC # BLD AUTO: 4.77 X10(6)UL
SODIUM SERPL-SCNC: 142 MMOL/L (ref 136–145)
TRIGL SERPL-MCNC: 91 MG/DL (ref 30–149)
TSI SER-ACNC: 4 UIU/ML (ref 0.55–4.78)
VLDLC SERPL CALC-MCNC: 14 MG/DL (ref 0–30)
WBC # BLD AUTO: 6.8 X10(3) UL (ref 4–11)

## 2025-04-07 PROCEDURE — 83036 HEMOGLOBIN GLYCOSYLATED A1C: CPT

## 2025-04-07 PROCEDURE — 84443 ASSAY THYROID STIM HORMONE: CPT

## 2025-04-07 PROCEDURE — 82570 ASSAY OF URINE CREATININE: CPT

## 2025-04-07 PROCEDURE — 85025 COMPLETE CBC W/AUTO DIFF WBC: CPT

## 2025-04-07 PROCEDURE — 80061 LIPID PANEL: CPT

## 2025-04-07 PROCEDURE — 82043 UR ALBUMIN QUANTITATIVE: CPT

## 2025-04-07 PROCEDURE — 80053 COMPREHEN METABOLIC PANEL: CPT

## 2025-04-07 PROCEDURE — 36415 COLL VENOUS BLD VENIPUNCTURE: CPT

## 2025-04-10 ENCOUNTER — OFFICE VISIT (OUTPATIENT)
Dept: INTERNAL MEDICINE CLINIC | Facility: CLINIC | Age: 75
End: 2025-04-10
Payer: MEDICARE

## 2025-04-10 VITALS
DIASTOLIC BLOOD PRESSURE: 68 MMHG | RESPIRATION RATE: 16 BRPM | WEIGHT: 226.38 LBS | BODY MASS INDEX: 31.69 KG/M2 | HEIGHT: 71 IN | OXYGEN SATURATION: 98 % | HEART RATE: 81 BPM | TEMPERATURE: 98 F | SYSTOLIC BLOOD PRESSURE: 128 MMHG

## 2025-04-10 DIAGNOSIS — G47.00 INSOMNIA, UNSPECIFIED TYPE: ICD-10-CM

## 2025-04-10 DIAGNOSIS — Z00.00 ENCOUNTER FOR ANNUAL HEALTH EXAMINATION: Primary | ICD-10-CM

## 2025-04-10 DIAGNOSIS — R97.20 ELEVATED PSA: ICD-10-CM

## 2025-04-10 DIAGNOSIS — I10 ESSENTIAL HYPERTENSION: ICD-10-CM

## 2025-04-10 DIAGNOSIS — F41.9 ANXIETY: ICD-10-CM

## 2025-04-10 DIAGNOSIS — G61.81 CIDP (CHRONIC INFLAMMATORY DEMYELINATING POLYNEUROPATHY) (HCC): ICD-10-CM

## 2025-04-10 RX ORDER — DILTIAZEM HYDROCHLORIDE 300 MG/1
300 CAPSULE, COATED, EXTENDED RELEASE ORAL
Qty: 90 CAPSULE | Refills: 3 | Status: SHIPPED | OUTPATIENT
Start: 2025-04-10

## 2025-04-10 RX ORDER — ALPRAZOLAM 0.5 MG
0.5 TABLET ORAL NIGHTLY PRN
Qty: 20 TABLET | Refills: 0 | Status: SHIPPED | OUTPATIENT
Start: 2025-04-10

## 2025-04-10 RX ORDER — LISINOPRIL 40 MG/1
40 TABLET ORAL
Qty: 90 TABLET | Refills: 3 | Status: SHIPPED | OUTPATIENT
Start: 2025-04-10

## 2025-04-10 RX ORDER — TRAZODONE HYDROCHLORIDE 50 MG/1
TABLET ORAL
Qty: 20 TABLET | Refills: 0 | Status: SHIPPED | OUTPATIENT
Start: 2025-04-10

## 2025-04-10 NOTE — PROGRESS NOTES
Subjective:   Yohan Cook is a 74 year old male who presents for a Medicare Subsequent Annual Wellness visit (Pt already had Initial Annual Wellness) and scheduled follow up of multiple significant but stable problems.   History of Present Illness      1. Encounter for annual health examination (Primary)- doing well overall. Trying to keep physically  active.   2. Essential hypertension- taking BP meds as prescribed with no issues.  3. CIDP (chronic inflammatory demyelinating polyneuropathy) ([facility])- no recent symptoms.   4. Elevated PSA  -no symptoms.   5. Anxiety  -   continues to use Xanax rarely.     6. Insomnia- would like to sleep for a longer period of time in the night. Wakes frequently.    History/Other:   Fall Risk Assessment:   He has been screened for Falls and is low risk.      Cognitive Assessment:   He had a completely normal cognitive assessment - see flowsheet entries     Functional Ability/Status:   Yohan Cook has a completely normal functional assessment. See flowsheet for details.      Depression Screening (PHQ):  PHQ-2 SCORE: 0  , done 4/5/2025             Advanced Directives:   He does NOT have a Living Will. [Do you have a living will?: (Patient-Rptd) No]  He does NOT have a Power of  for Health Care. [Do you have a healthcare power of ?: (Patient-Rptd) No]  Patient has Advance Care Planning documents but we do not have a copy in EMR. Discussed Advanced Care Planning with patient and instructed patient to get our office a copy to be scanned into EMR.      Patient Active Problem List   Diagnosis    Essential hypertension    Impaired glucose tolerance    History of adenomatous polyp of colon    Neuropathy    DDD (degenerative disc disease), lumbosacral    Epidural lipomatosis    CIDP (chronic inflammatory demyelinating polyneuropathy) ([facility])     Allergies:  He has no known allergies.    Current Medications:  Outpatient Medications Marked as Taking for the 4/10/25  encounter (Office Visit) with Ellie Iglesias,    Medication Sig    dilTIAZem  MG Oral Capsule SR 24 Hr Take 1 capsule (300 mg total) by mouth in the morning.    lisinopril 40 MG Oral Tab Take 1 tablet (40 mg total) by mouth in the morning.    ALPRAZolam 0.5 MG Oral Tab Take 1 tablet (0.5 mg total) by mouth nightly as needed (do not take more than twice weekly).    traZODone 50 MG Oral Tab 1/2 tab to one tab PO at bedtime PRN    Meloxicam 15 MG Oral Tab Take 1 tablet (15 mg total) by mouth daily as needed.    Multiple Vitamins-Minerals (MULTI-VITAMIN/MINERALS) Oral Tab Take 1 tablet by mouth daily.       Medical History:  He  has a past medical history of Allergic rhinitis, Arthritis, Back pain, Essential hypertension, Gout, High blood pressure, Hypertension, Multiple allergies, S/P arthroscopy of right shoulder/DCE, SAD & labral debridment sx 2-27-20/Global 5-25-20/TRK/EP (02/27/2020), Tendinitis of right rotator cuff (09/17/2019), and Wears glasses.  Surgical History:  He  has a past surgical history that includes appendectomy; back surgery; colonoscopy; shoulder arthroscopy (Right, 02/27/2020); and cataract.   Family History:  His family history includes Cancer in his father and sister; Depression in an other family member; Heart Attack in his mother; Heart Disease in his father, mother, and sister; Heart Disorder in his mother; Hypertension in his father and mother; Kidney stones in an other family member; Lung Disorder in an other family member; Non-hodgkins lymphoma in his father and another family member; Prostate Cancer in his father; Pulmonary Disease in his sister.  Social History:  He  reports that he has never smoked. He has never used smokeless tobacco. He reports current alcohol use of about 8.0 standard drinks of alcohol per week. He reports that he does not use drugs.    Tobacco:  He has never smoked tobacco.    CAGE Alcohol Screen:   CAGE screening score of 0 on 4/5/2025, showing low risk of  alcohol abuse.      Patient Care Team:  Ellie Iglesias DO as PCP - General    Review of Systems  GENERAL: feels well otherwise  SKIN: denies any unusual skin lesions  EYES: denies blurred vision or double vision  HEENT: denies nasal congestion, sinus pain or ST  LUNGS: denies shortness of breath with exertion  CARDIOVASCULAR: denies chest pain on exertion  GI: denies abdominal pain, denies heartburn  : 1 per night nocturia, no complaint of urinary incontinence  MUSCULOSKELETAL: denies back pain  NEURO: denies headaches  PSYCHE: denies depression or anxiety  HEMATOLOGIC: denies hx of anemia  ENDOCRINE: denies thyroid history  ALL/ASTHMA: denies hx of allergy or asthma    Objective:   Physical Exam  General Appearance:  Alert, cooperative, no distress, appears stated age   Head:  Normocephalic, without obvious abnormality, atraumatic   Eyes:  PERRL, conjunctiva/corneas clear, EOM's intact, both eyes   Ears:  Normal TM's and external ear canals, both ears   Nose: Nares normal, septum midline, mucosa normal, no drainage or sinus tenderness   Throat: Lips, mucosa, and tongue normal; teeth and gums normal   Neck: Supple, symmetrical, trachea midline, no adenopathy, thyroid: not enlarged, symmetric, no tenderness/mass/nodules, no carotid bruit or JVD   Back:   Symmetric, no curvature, ROM normal, no CVA tenderness   Lungs:   Clear to auscultation bilaterally, respirations unlabored   Chest Wall:  No tenderness or deformity   Heart:  Regular rate and rhythm, S1, S2 normal, no murmur, rub or gallop               Extremities: Extremities normal, atraumatic, no cyanosis or edema   Pulses: 2+ and symmetric   Skin: Skin color, texture, turgor normal, no rashes or lesions   Lymph nodes: Cervical, supraclavicular, and axillary nodes normal   Neurologic: Normal     /68 (BP Location: Left arm, Patient Position: Sitting, Cuff Size: adult)   Pulse 81   Temp 97.7 °F (36.5 °C) (Temporal)   Resp 16   Ht 5' 11\" (1.803 m)    Wt 226 lb 6.4 oz (102.7 kg)   SpO2 98%   BMI 31.58 kg/m²  Estimated body mass index is 31.58 kg/m² as calculated from the following:    Height as of this encounter: 5' 11\" (1.803 m).    Weight as of this encounter: 226 lb 6.4 oz (102.7 kg).    Medicare Hearing Assessment:   Hearing Screening    Time taken: 4/10/2025 12:30 PM  Screening Method: Finger Rub  Finger Rub Result: Pass         Visual Acuity:   Right Eye Visual Acuity: Corrected Right Eye Chart Acuity: 20/25   Left Eye Visual Acuity: Corrected Left Eye Chart Acuity: 20/25   Both Eyes Visual Acuity: Corrected Both Eyes Chart Acuity: 20/25   Able To Tolerate Visual Acuity: Yes        Assessment & Plan:   Yohan Cook is a 74 year old male who presents for a Medicare Assessment.     1. Encounter for annual health examination (Primary)  2. Essential hypertension  -     dilTIAZem HCl ER Coated Beads; Take 1 capsule (300 mg total) by mouth in the morning.  Dispense: 90 capsule; Refill: 3  -     Lisinopril; Take 1 tablet (40 mg total) by mouth in the morning.  Dispense: 90 tablet; Refill: 3  3. CIDP (chronic inflammatory demyelinating polyneuropathy) ([facility])  4. Elevated PSA  -     PSA Total, Screen; Future; Expected date: 04/10/2025  5. Anxiety  -     ALPRAZolam; Take 1 tablet (0.5 mg total) by mouth nightly as needed (do not take more than twice weekly).  Dispense: 20 tablet; Refill: 0  6. Insomnia, unspecified type  -     traZODone HCl; 1/2 tab to one tab PO at bedtime PRN  Dispense: 20 tablet; Refill: 0            The patient indicates understanding of these issues and agrees to the plan.  Reinforced healthy diet, lifestyle, and exercise.  1. Encounter for annual health examination (Primary)- Reviewed age-appropriate preventive health and safety recommendations with patient. Reviewed lab results. Encouraged regular exercise and healthy eating.   2. Essential hypertension- BP controlled on med. Continue.   -     dilTIAZem HCl ER Coated Beads; Take 1  capsule (300 mg total) by mouth in the morning.  Dispense: 90 capsule; Refill: 3  -     Lisinopril; Take 1 tablet (40 mg total) by mouth in the morning.  Dispense: 90 tablet; Refill: 3  3. CIDP (chronic inflammatory demyelinating polyneuropathy) ([facility])- stable clinically. Continue to monitor.   4. Elevated PSA- recheck in 4 weeks.   -     PSA Total, Screen; Future; Expected date: 04/10/2025  5. Anxiety- continue rare use. Do not take if taking trazodone.   -     ALPRAZolam; Take 1 tablet (0.5 mg total) by mouth nightly as needed (do not take more than twice weekly).  Dispense: 20 tablet; Refill: 0  6. Insomnia, unspecified type  -     traZODone HCl; 1/2 tab to one tab PO at bedtime PRN  Dispense: 20 tablet; Refill: 0  Discussed risks/benefits/potential side effects and proper use of medication.       Return in 1 year (on 4/10/2026).     Ellie Iglesias DO, 4/10/2025     Supplementary Documentation:   General Health:  In the past six months, have you lost more than 10 pounds without trying?: (Patient-Rptd) 2 - No  Has your appetite been poor?: (Patient-Rptd) No  Type of Diet: (Patient-Rptd) Balanced  How does the patient maintain a good energy level?: (Patient-Rptd) Daily Walks  How would you describe your daily physical activity?: (Patient-Rptd) Light  How would you describe your current health state?: (Patient-Rptd) Fair  How do you maintain positive mental well-being?: (Patient-Rptd) Social Interaction, Games, Visiting Friends  On a scale of 0 to 10, with 0 being no pain and 10 being severe pain, what is your pain level?: (Patient-Rptd) 5 - (Moderate)  In the past six months, have you experienced urine leakage?: (Patient-Rptd) 0-No  At any time do you feel concerned for the safety/well-being of yourself and/or your children, in your home or elsewhere?: (Patient-Rptd) No  Have you had any immunizations at another office such as Influenza, Hepatitis B, Tetanus, or Pneumococcal?: (Patient-Rptd) No    Health  Maintenance   Topic Date Due    Annual Depression Screening  01/01/2025    Annual Physical  02/23/2025    Colorectal Cancer Screening  05/18/2026    PSA  04/07/2027    Influenza Vaccine  Completed    Fall Risk Screening (Annual)  Completed    Pneumococcal Vaccine: 50+ Years  Completed    Zoster Vaccines  Completed    Meningococcal B Vaccine  Aged Out

## 2025-05-23 ENCOUNTER — LABORATORY ENCOUNTER (OUTPATIENT)
Dept: LAB | Age: 75
End: 2025-05-23
Attending: FAMILY MEDICINE
Payer: MEDICARE

## 2025-05-23 DIAGNOSIS — R97.20 ELEVATED PSA: ICD-10-CM

## 2025-05-23 LAB — COMPLEXED PSA SERPL-MCNC: 3.57 NG/ML (ref ?–4)

## 2025-05-23 PROCEDURE — 36415 COLL VENOUS BLD VENIPUNCTURE: CPT

## 2025-05-30 ENCOUNTER — PATIENT MESSAGE (OUTPATIENT)
Dept: INTERNAL MEDICINE CLINIC | Facility: CLINIC | Age: 75
End: 2025-05-30

## 2025-05-30 DIAGNOSIS — G47.00 INSOMNIA, UNSPECIFIED TYPE: ICD-10-CM

## 2025-06-05 RX ORDER — TRAZODONE HYDROCHLORIDE 50 MG/1
TABLET ORAL
Qty: 45 TABLET | Refills: 0 | Status: SHIPPED | OUTPATIENT
Start: 2025-06-05

## 2025-06-05 NOTE — TELEPHONE ENCOUNTER
, patient would like to first try increasing Trazodone.   
Dr. Iglesias- Patient would like to try 75mg, ok to try? Increased to 50mg with minimal effect   
Dr. Iglesias: last office visit 4/10/25, patient requesting alternative sleep aide   
He will need to do video visit or in-person visit for new prescription, particularly because the other options are often not recommended for patients over 60, so we'd have to talk about risks vs benefits. We could also try increasing the trazodone instead.   
Just verify what dose he took of Trazodone that didn't work. Did he take a full tablet at any point and that didn't work? If so, we can increase slowly- so if was taking 50mg and this didn't work then we can increase to 75mg at night- he can take 1 1/2 tabs of his current Rx.   
Rx sent.   
23-Aug-2024 02:50

## 2025-07-08 DIAGNOSIS — F41.9 ANXIETY: ICD-10-CM

## 2025-07-10 NOTE — TELEPHONE ENCOUNTER
Please review.  Protocol failed/has no protocol.    Recent fills each # 20 : 04/10/2025,02/13/25  Last prescription written: 04/10/2025  Last office visit:  4/10/2025    No future appointments.

## 2025-07-11 RX ORDER — ALPRAZOLAM 0.5 MG
0.5 TABLET ORAL NIGHTLY PRN
Qty: 20 TABLET | Refills: 0 | Status: SHIPPED | OUTPATIENT
Start: 2025-07-11

## 2025-07-21 ENCOUNTER — TELEPHONE (OUTPATIENT)
Age: 75
End: 2025-07-21

## 2025-07-21 DIAGNOSIS — Z01.818 PREOPERATIVE EXAMINATION: Primary | ICD-10-CM

## 2025-07-21 DIAGNOSIS — M17.11 PRIMARY OSTEOARTHRITIS OF RIGHT KNEE: ICD-10-CM

## 2025-07-21 NOTE — TELEPHONE ENCOUNTER
Surgery date: 09/10/25  Surgeon's name: Alvaro Zapatason  # [phone]  Fax# [phone]  Procedure: R total knee arthroplasty      Form placed in Ellie Iglesias DO pre op folder for review.

## 2025-07-22 NOTE — TELEPHONE ENCOUNTER
Patient already schedule himself for pre op appointment    Future Appointments   Date Time Provider Department Center   8/18/2025 11:30 AM Ellie Iglesias DO [facility]

## 2025-07-22 NOTE — TELEPHONE ENCOUNTER
He needs labs and EKG prior. Orders are at [facility]. Typically labs and EKG need to be within 30 days of surgery and he should try to have them done before seeing me.

## 2025-08-11 ENCOUNTER — EKG ENCOUNTER (OUTPATIENT)
Dept: LAB | Age: 75
End: 2025-08-11
Attending: FAMILY MEDICINE

## 2025-08-11 ENCOUNTER — LABORATORY ENCOUNTER (OUTPATIENT)
Dept: LAB | Age: 75
End: 2025-08-11
Attending: FAMILY MEDICINE

## 2025-08-11 DIAGNOSIS — Z01.818 PREOPERATIVE EXAMINATION: ICD-10-CM

## 2025-08-11 DIAGNOSIS — M17.11 PRIMARY OSTEOARTHRITIS OF RIGHT KNEE: ICD-10-CM

## 2025-08-11 LAB
ALBUMIN SERPL-MCNC: 4.5 G/DL (ref 3.2–4.8)
ALBUMIN/GLOB SERPL: 1.7 (ref 1–2)
ALP LIVER SERPL-CCNC: 62 U/L (ref 45–117)
ALT SERPL-CCNC: 51 U/L (ref 10–49)
ANION GAP SERPL CALC-SCNC: 15 MMOL/L (ref 0–18)
AST SERPL-CCNC: 43 U/L (ref ?–34)
BASOPHILS # BLD AUTO: 0.07 X10(3) UL (ref 0–0.2)
BASOPHILS NFR BLD AUTO: 1.2 %
BILIRUB SERPL-MCNC: 0.7 MG/DL (ref 0.2–1.1)
BUN BLD-MCNC: 11 MG/DL (ref 9–23)
CALCIUM BLD-MCNC: 10 MG/DL (ref 8.7–10.6)
CHLORIDE SERPL-SCNC: 100 MMOL/L (ref 98–112)
CO2 SERPL-SCNC: 24 MMOL/L (ref 21–32)
CREAT BLD-MCNC: 0.92 MG/DL (ref 0.7–1.3)
EGFRCR SERPLBLD CKD-EPI 2021: 88 ML/MIN/1.73M2 (ref 60–?)
EOSINOPHIL # BLD AUTO: 0.11 X10(3) UL (ref 0–0.7)
EOSINOPHIL NFR BLD AUTO: 1.9 %
ERYTHROCYTE [DISTWIDTH] IN BLOOD BY AUTOMATED COUNT: 12.3 %
GLOBULIN PLAS-MCNC: 2.7 G/DL (ref 2–3.5)
GLUCOSE BLD-MCNC: 109 MG/DL (ref 70–99)
HCT VFR BLD AUTO: 46.6 % (ref 39–53)
HGB BLD-MCNC: 16.1 G/DL (ref 13–17.5)
IMM GRANULOCYTES # BLD AUTO: 0.01 X10(3) UL (ref 0–1)
IMM GRANULOCYTES NFR BLD: 0.2 %
LYMPHOCYTES # BLD AUTO: 1.27 X10(3) UL (ref 1–4)
LYMPHOCYTES NFR BLD AUTO: 21.8 %
MCH RBC QN AUTO: 35.2 PG (ref 26–34)
MCHC RBC AUTO-ENTMCNC: 34.5 G/DL (ref 31–37)
MCV RBC AUTO: 101.7 FL (ref 80–100)
MONOCYTES # BLD AUTO: 0.79 X10(3) UL (ref 0.1–1)
MONOCYTES NFR BLD AUTO: 13.6 %
NEUTROPHILS # BLD AUTO: 3.57 X10 (3) UL (ref 1.5–7.7)
NEUTROPHILS # BLD AUTO: 3.57 X10(3) UL (ref 1.5–7.7)
NEUTROPHILS NFR BLD AUTO: 61.3 %
OSMOLALITY SERPL CALC.SUM OF ELEC: 288 MOSM/KG (ref 275–295)
PLATELET # BLD AUTO: 159 10(3)UL (ref 150–450)
POTASSIUM SERPL-SCNC: 4.5 MMOL/L (ref 3.5–5.1)
PROT SERPL-MCNC: 7.2 G/DL (ref 5.7–8.2)
RBC # BLD AUTO: 4.58 X10(6)UL (ref 3.8–5.8)
SODIUM SERPL-SCNC: 139 MMOL/L (ref 136–145)
WBC # BLD AUTO: 5.8 X10(3) UL (ref 4–11)

## 2025-08-11 PROCEDURE — 85025 COMPLETE CBC W/AUTO DIFF WBC: CPT

## 2025-08-11 PROCEDURE — 36415 COLL VENOUS BLD VENIPUNCTURE: CPT

## 2025-08-11 PROCEDURE — 80053 COMPREHEN METABOLIC PANEL: CPT

## 2025-08-12 LAB
ATRIAL RATE: 82 BPM
P AXIS: 38 DEGREES
P-R INTERVAL: 134 MS
Q-T INTERVAL: 364 MS
QRS DURATION: 84 MS
QTC CALCULATION (BEZET): 425 MS
R AXIS: 45 DEGREES
T AXIS: 45 DEGREES
VENTRICULAR RATE: 82 BPM

## 2025-08-18 ENCOUNTER — OFFICE VISIT (OUTPATIENT)
Age: 75
End: 2025-08-18

## 2025-08-18 ENCOUNTER — LABORATORY ENCOUNTER (OUTPATIENT)
Dept: LAB | Age: 75
End: 2025-08-18
Attending: ORTHOPAEDIC SURGERY

## 2025-08-18 VITALS
HEIGHT: 69.5 IN | HEART RATE: 76 BPM | BODY MASS INDEX: 33.42 KG/M2 | OXYGEN SATURATION: 96 % | RESPIRATION RATE: 18 BRPM | SYSTOLIC BLOOD PRESSURE: 130 MMHG | WEIGHT: 230.81 LBS | DIASTOLIC BLOOD PRESSURE: 60 MMHG | TEMPERATURE: 98 F

## 2025-08-18 DIAGNOSIS — M17.11 PRIMARY OSTEOARTHRITIS OF RIGHT KNEE: ICD-10-CM

## 2025-08-18 DIAGNOSIS — Z01.818 PRE-OP TESTING: ICD-10-CM

## 2025-08-18 DIAGNOSIS — Z01.818 PREOPERATIVE EXAMINATION: Primary | ICD-10-CM

## 2025-08-18 LAB
ANTIBODY SCREEN: NEGATIVE
RH BLOOD TYPE: POSITIVE

## 2025-08-18 PROCEDURE — 87081 CULTURE SCREEN ONLY: CPT

## 2025-08-18 PROCEDURE — 86900 BLOOD TYPING SEROLOGIC ABO: CPT

## 2025-08-18 PROCEDURE — 86850 RBC ANTIBODY SCREEN: CPT

## 2025-08-18 PROCEDURE — G2211 COMPLEX E/M VISIT ADD ON: HCPCS | Performed by: FAMILY MEDICINE

## 2025-08-18 PROCEDURE — 86901 BLOOD TYPING SEROLOGIC RH(D): CPT

## 2025-08-18 PROCEDURE — 99214 OFFICE O/P EST MOD 30 MIN: CPT | Performed by: FAMILY MEDICINE

## 2025-08-18 RX ORDER — TIZANIDINE 2 MG/1
2 TABLET ORAL NIGHTLY PRN
Qty: 30 TABLET | Refills: 0 | Status: SHIPPED | OUTPATIENT
Start: 2025-08-18

## 2025-08-22 ENCOUNTER — TELEPHONE (OUTPATIENT)
Age: 75
End: 2025-08-22

## (undated) DIAGNOSIS — I10 ESSENTIAL HYPERTENSION: ICD-10-CM

## (undated) NOTE — Clinical Note
Krishan, sending this patient for you w/ L5 radiucopathy, mostly numbness and no pain. MRI report wasn't very impressive. I think there likely is still role for medical management, maybe duloxetine, amitriptyline or gabapentin, he's tried lyrica and TY before. Let me know what you think. I think counseling is important